# Patient Record
Sex: MALE | Race: WHITE | Employment: FULL TIME | URBAN - METROPOLITAN AREA
[De-identification: names, ages, dates, MRNs, and addresses within clinical notes are randomized per-mention and may not be internally consistent; named-entity substitution may affect disease eponyms.]

---

## 2020-09-30 ENCOUNTER — OFFICE VISIT (OUTPATIENT)
Dept: BEHAVIORAL HEALTH | Facility: CLINIC | Age: 30
End: 2020-09-30

## 2020-09-30 ENCOUNTER — OFFICE VISIT (OUTPATIENT)
Dept: FAMILY MEDICINE | Facility: CLINIC | Age: 30
End: 2020-09-30
Payer: COMMERCIAL

## 2020-09-30 VITALS
TEMPERATURE: 97.3 F | SYSTOLIC BLOOD PRESSURE: 120 MMHG | DIASTOLIC BLOOD PRESSURE: 76 MMHG | BODY MASS INDEX: 25.73 KG/M2 | WEIGHT: 179.75 LBS | RESPIRATION RATE: 15 BRPM | HEIGHT: 70 IN | HEART RATE: 53 BPM | OXYGEN SATURATION: 100 %

## 2020-09-30 DIAGNOSIS — Z23 NEEDS FLU SHOT: ICD-10-CM

## 2020-09-30 DIAGNOSIS — Z00.00 ENCOUNTER FOR PREVENTIVE CARE: Primary | ICD-10-CM

## 2020-09-30 DIAGNOSIS — Z13.220 SCREENING FOR LIPOID DISORDERS: ICD-10-CM

## 2020-09-30 DIAGNOSIS — F41.9 ANXIETY AND DEPRESSION: ICD-10-CM

## 2020-09-30 DIAGNOSIS — F32.A ANXIETY AND DEPRESSION: ICD-10-CM

## 2020-09-30 DIAGNOSIS — Z00.00 ROUTINE GENERAL MEDICAL EXAMINATION AT A HEALTH CARE FACILITY: ICD-10-CM

## 2020-09-30 DIAGNOSIS — F43.23 ADJUSTMENT DISORDER WITH MIXED ANXIETY AND DEPRESSED MOOD: Primary | ICD-10-CM

## 2020-09-30 DIAGNOSIS — Z12.5 SCREENING FOR PROSTATE CANCER: ICD-10-CM

## 2020-09-30 LAB
BUN SERPL-MCNC: 13 MG/DL (ref 5–24)
CALCIUM SERPL-MCNC: 9.9 MG/DL (ref 8.5–10.4)
CHLORIDE SERPLBLD-SCNC: 101 MMOL/L (ref 94–109)
CHOLEST SERPL-MCNC: 189 MG/DL (ref 0–200)
CHOLEST/HDLC SERPL: 3.2 {RATIO} (ref 0–5)
CO2 SERPL-SCNC: 30 MMOL/L (ref 20–32)
CREAT SERPL-MCNC: 0.8 MG/DL (ref 0.8–1.5)
EGFR CALCULATED (BLACK REFERENCE): 145
EGFR CALCULATED (NON BLACK REFERENCE): 119.8
FASTING SPECIMEN: NO
GLUCOSE SERPL-MCNC: 98 MG/DL (ref 60–99)
HDLC SERPL-MCNC: 59 MG/DL
LDLC SERPL CALC-MCNC: 107 MG/DL (ref 0–129)
POTASSIUM SERPL-SCNC: 4.9 MMOL/L (ref 3.4–5.3)
PSA SERPL-MCNC: 0.86 UG/L (ref 0–4)
SODIUM SERPL-SCNC: 142 MMOL/L (ref 137.3–146.3)
TRIGL SERPL-MCNC: 110 MG/DL (ref 0–150)
VLDL-CHOLESTEROL: 22 (ref 7–32)

## 2020-09-30 RX ORDER — CITALOPRAM HYDROBROMIDE 20 MG/1
20 TABLET ORAL DAILY
Qty: 30 TABLET | Refills: 0 | Status: SHIPPED | OUTPATIENT
Start: 2020-09-30 | End: 2020-10-22

## 2020-09-30 RX ORDER — LORATADINE 10 MG/1
10 TABLET ORAL DAILY PRN
COMMUNITY

## 2020-09-30 SDOH — HEALTH STABILITY: MENTAL HEALTH: HOW OFTEN DO YOU HAVE A DRINK CONTAINING ALCOHOL?: 2-4 TIMES A MONTH

## 2020-09-30 SDOH — HEALTH STABILITY: MENTAL HEALTH: HOW MANY DRINKS CONTAINING ALCOHOL DO YOU HAVE ON A TYPICAL DAY WHEN YOU ARE DRINKING?: 5 OR 6

## 2020-09-30 SDOH — HEALTH STABILITY: MENTAL HEALTH: HOW OFTEN DO YOU HAVE SIX OR MORE DRINKS ON ONE OCCASION?: WEEKLY

## 2020-09-30 ASSESSMENT — MIFFLIN-ST. JEOR: SCORE: 1776.59

## 2020-09-30 ASSESSMENT — PATIENT HEALTH QUESTIONNAIRE - PHQ9: SUM OF ALL RESPONSES TO PHQ QUESTIONS 1-9: 10

## 2020-09-30 NOTE — NURSING NOTE
"30 year old  Chief Complaint   Patient presents with     Physical     new     Anxiety     and depression recently       Blood pressure 120/76, pulse 53, temperature 97.3  F (36.3  C), temperature source Skin, resp. rate 15, height 1.778 m (5' 10\"), weight 81.5 kg (179 lb 12 oz), SpO2 100 %. Body mass index is 25.79 kg/m .  There is no problem list on file for this patient.      Wt Readings from Last 2 Encounters:   09/30/20 81.5 kg (179 lb 12 oz)     BP Readings from Last 3 Encounters:   09/30/20 120/76         Current Outpatient Medications   Medication     loratadine (CLARITIN) 10 MG tablet     No current facility-administered medications for this visit.        Social History     Tobacco Use     Smoking status: Light Tobacco Smoker     Smokeless tobacco: Never Used     Tobacco comment: occasionally   Substance Use Topics     Alcohol use: Yes     Frequency: 2-4 times a month     Drinks per session: 5 or 6     Binge frequency: Weekly     Drug use: Never       Health Maintenance Due   Topic Date Due     PREVENTIVE CARE VISIT  1989     HIV SCREENING  07/08/2004     PHQ-2  01/01/2020     INFLUENZA VACCINE (1) 09/01/2020       No results found for: PAP      September 30, 2020 10:52 AM    "

## 2020-09-30 NOTE — PROGRESS NOTES
3  SUBJECTIVE:   CC: Mohit Fitzpatrick is an 30 year old male who presents for preventive health visit.     Patient has been advised of split billing requirements and indicates understanding: Yes     Healthy Habits:    Do you get at least three servings of calcium containing foods daily (dairy, green leafy vegetables, etc.)? no, taking calcium and/or vitamin D supplement: no    Amount of exercise or daily activities, outside of work: 4 day(s) per week    Problems taking medications regularly not applicable    Medication side effects: No    Have you had an eye exam in the past two years? yes    Do you see a dentist twice per year? yes    Do you have sleep apnea, excessive snoring or daytime drowsiness?no      PROBLEMS TO ADD ON...  Anxiety and Depression  - seems like in the past year it has gotten worse  - feels like he has less patience, little things set him off  - doesn't really feel like he understands why things are different  - has never been to counseling or tried medication for this in the past    Today's PHQ-2 Score:   PHQ-2 ( 1999 [employer]) 9/30/2020   Q1: Little interest or pleasure in doing things 1   Q2: Feeling down, depressed or hopeless 2   PHQ-2 Score 3     PHQ 9/30/2020   PHQ-9 Total Score 10   Q9: Thoughts of better off dead/self-harm past 2 weeks Several days     Patient denies any active thoughts of self harm or concern for personal safety at this time    Abuse: Current or Past(Physical, Sexual or Emotional)- No  Do you feel safe in your environment? Yes    Social History     Tobacco Use     Smoking status: Not on file   Substance Use Topics     Alcohol use: Not on file     If you drink alcohol do you typically have >3 drinks per day or >7 drinks per week? No                      Last PSA: No results found for: PSA    Reviewed orders with patient. Reviewed health maintenance and updated orders accordingly - Yes    Reviewed and updated as needed this visit by clinical staff  Tobacco  Meds  Surg  "Hx  Fam Hx  Soc Hx        Reviewed and updated as needed this visit by Provider  Tobacco  Allergies  Meds  Problems  Med Hx  Surg Hx  Fam Hx  Soc Hx         Past Medical History:   Diagnosis Date     Anxiety and depression 9/30/2020      History reviewed. No pertinent surgical history.    ROS:  CONSTITUTIONAL: NEGATIVE for fever, chills, change in weight  INTEGUMENTARY/SKIN: NEGATIVE for worrisome rashes, moles or lesions  EYES: NEGATIVE for vision changes or irritation  ENT: NEGATIVE for ear, mouth and throat problems  RESP: NEGATIVE for significant cough or SOB  CV: NEGATIVE for chest pain, palpitations or peripheral edema  GI: NEGATIVE for nausea, abdominal pain, heartburn, or change in bowel habits   male: negative for dysuria, hematuria, decreased urinary stream, erectile dysfunction, urethral discharge  MUSCULOSKELETAL: NEGATIVE for significant arthralgias or myalgia  NEURO: NEGATIVE for weakness, dizziness or paresthesias  PSYCHIATRIC: Mood concerns as noted above.     OBJECTIVE:   /76 (BP Location: Left arm, Patient Position: Sitting, Cuff Size: Adult Regular)   Pulse 53   Temp 97.3  F (36.3  C) (Skin)   Resp 15   Ht 1.778 m (5' 10\")   Wt 81.5 kg (179 lb 12 oz)   SpO2 100%   BMI 25.79 kg/m    EXAM:  GENERAL: healthy, alert and no distress  EYES: Eyes grossly normal to inspection, PERRL and conjunctivae and sclerae normal  HENT: ear canals and TM's normal, nose and mouth without ulcers or lesions  NECK: no adenopathy, no asymmetry, masses, or scars and thyroid normal to palpation  RESP: lungs clear to auscultation - no rales, rhonchi or wheezes  CV: regular rate and rhythm, normal S1 S2, no S3 or S4, no murmur, click or rub, no peripheral edema and peripheral pulses strong  ABDOMEN: soft, nontender, no hepatosplenomegaly, no masses and bowel sounds normal  MS: no gross musculoskeletal defects noted, no edema  SKIN: no suspicious lesions or rashes  NEURO: Normal strength and tone, " "mentation intact and speech normal  PSYCH: mentation appears normal, affect normal/bright    Diagnostic Test Results:  Labs reviewed in Epic    ASSESSMENT/PLAN:   Mohit was seen today for physical and anxiety.    Diagnoses and all orders for this visit:    Encounter for preventive care    Needs flu shot  -     C RIV4 (FLUBLOK) VACCINE RECOMBINANT DNA PRSRV ANTIBIO FREE, IM  -     ADMIN INFLUENZA VIRUS VACCINE    Screening for lipoid disorders  -     Lipid Panel ([facility])  -     Basic Metabolic Panel ([facility])    Routine general medical examination at a health care facility    Anxiety and depression  -     citalopram (CELEXA) 20 MG tablet; Take 1 tablet (20 mg) by mouth daily    Screening for prostate cancer  -     PSA tumor marker    Discussed options for addressing mood issues including review of multiple medications and side effect profiles. Patient is agreeable starting citalopram at this time. Will monitor closely for the first month. Advised patient to notify me via [employer] if he has any questions or concerns for medication safety or efficacy. Plan to follow up in clinic in one month unless earlier.     Warm hand-off with [facility] today. Patient expresses interest in meeting with [facility] for help managing mood issues.     Patient has been advised of split billing requirements and indicates understanding: Yes  COUNSELING:  Reviewed preventive health counseling, as reflected in patient instructions    Estimated body mass index is 25.79 kg/m  as calculated from the following:    Height as of this encounter: 1.778 m (5' 10\").    Weight as of this encounter: 81.5 kg (179 lb 12 oz).        He reports that he has been smoking. He has never used smokeless tobacco.      Counseling Resources:  ATP IV Guidelines  Pooled Cohorts Equation Calculator  FRAX Risk Assessment  ICSI Preventive Guidelines  Dietary Guidelines for Americans, 2010  USDA's MyPlate  ASA Prophylaxis  Lung CA Screening    Niles Payne MD  [place] " [facility]

## 2020-09-30 NOTE — NURSING NOTE
"Injectable Influenza Immunization Documentation    1.  Has the patient received the information for the injectable influenza vaccine? YES     2. Is the patient 6 months of age or older? YES     3. Does the patient have any of the following contraindications?         Severe allergy to eggs? No     Severe allergic reaction to previous influenza vaccines? No   Severe allergy to latex? No       History of Guillain-Posey syndrome? No     Currently have a temperature greater than 100.4F? No        4.  Severely egg allergic patients should have flu vaccine eligibility assessed by an MD, RN, or pharmacist, and those who received flu vaccine should be observed for 15 min by an MD, RN, Pharmacist, Medical Technician, or member of clinic staff.\": YES    5. Latex-allergic patients should be given latex-free influenza vaccine Yes. Please reference the Vaccine latex table to determine if your clinic s product is latex-containing.       Vaccination given by Claudia Avila CMA          "

## 2020-10-01 ENCOUNTER — OFFICE VISIT (OUTPATIENT)
Dept: BEHAVIORAL HEALTH | Facility: CLINIC | Age: 30
End: 2020-10-01

## 2020-10-01 DIAGNOSIS — Z53.9 ERRONEOUS ENCOUNTER--DISREGARD: ICD-10-CM

## 2020-10-01 DIAGNOSIS — F43.23 ADJUSTMENT DISORDER WITH MIXED ANXIETY AND DEPRESSED MOOD: Primary | ICD-10-CM

## 2020-10-01 NOTE — PROGRESS NOTES
Patient had appointment with his  primary care physician. [facility] services were  offered. No immediate safety/risk issues were reported or identified.  Explained the role of the [facility] and provided informational handout and contact information for the [facility].  Regulo expressed interest and stated he would schedule appt in the future.        Shawn G. Ullrich, [facility], Behavioral Health Clinician

## 2020-10-01 NOTE — PROGRESS NOTES
Patient had appointment with his  primary care physician. [facility] services were  offered. No immediate safety/risk issues were reported or identified.  Explained the role of the [facility] and provided informational handout and contact information for the [facility].  Regulo expressed interest and stated he would schedule appt in the future.      Shawn G. Ullrich, [facility], Behavioral Health Clinician        This encounter was opened in error. Please disregard.

## 2020-10-14 ENCOUNTER — VIRTUAL VISIT (OUTPATIENT)
Dept: BEHAVIORAL HEALTH | Facility: CLINIC | Age: 30
End: 2020-10-14
Payer: COMMERCIAL

## 2020-10-14 DIAGNOSIS — F43.23 ADJUSTMENT DISORDER WITH MIXED ANXIETY AND DEPRESSED MOOD: Primary | ICD-10-CM

## 2020-10-14 ASSESSMENT — COLUMBIA-SUICIDE SEVERITY RATING SCALE - C-SSRS
ATTEMPT PAST THREE MONTHS: NO
2. HAVE YOU ACTUALLY HAD ANY THOUGHTS OF KILLING YOURSELF?: NO
2. HAVE YOU ACTUALLY HAD ANY THOUGHTS OF KILLING YOURSELF LIFETIME?: NO
TOTAL  NUMBER OF ABORTED OR SELF INTERRUPTED ATTEMPTS PAST LIFETIME: NO
TOTAL  NUMBER OF INTERRUPTED ATTEMPTS PAST 3 MONTHS: NO
TOTAL  NUMBER OF INTERRUPTED ATTEMPTS LIFETIME: NO
4. HAVE YOU HAD THESE THOUGHTS AND HAD SOME INTENTION OF ACTING ON THEM?: NO
6. HAVE YOU EVER DONE ANYTHING, STARTED TO DO ANYTHING, OR PREPARED TO DO ANYTHING TO END YOUR LIFE?: NO
5. HAVE YOU STARTED TO WORK OUT OR WORKED OUT THE DETAILS OF HOW TO KILL YOURSELF? DO YOU INTEND TO CARRY OUT THIS PLAN?: NO
1. IN THE PAST MONTH, HAVE YOU WISHED YOU WERE DEAD OR WISHED YOU COULD GO TO SLEEP AND NOT WAKE UP?: YES
1. IN THE PAST MONTH, HAVE YOU WISHED YOU WERE DEAD OR WISHED YOU COULD GO TO SLEEP AND NOT WAKE UP?: NO
ATTEMPT LIFETIME: NO
4. HAVE YOU HAD THESE THOUGHTS AND HAD SOME INTENTION OF ACTING ON THEM?: NO
5. HAVE YOU STARTED TO WORK OUT OR WORKED OUT THE DETAILS OF HOW TO KILL YOURSELF? DO YOU INTEND TO CARRY OUT THIS PLAN?: NO
6. HAVE YOU EVER DONE ANYTHING, STARTED TO DO ANYTHING, OR PREPARED TO DO ANYTHING TO END YOUR LIFE?: NO
3. HAVE YOU BEEN THINKING ABOUT HOW YOU MIGHT KILL YOURSELF?: NO
TOTAL  NUMBER OF ABORTED OR SELF INTERRUPTED ATTEMPTS PAST 3 MONTHS: NO

## 2020-10-14 ASSESSMENT — ANXIETY QUESTIONNAIRES
IF YOU CHECKED OFF ANY PROBLEMS ON THIS QUESTIONNAIRE, HOW DIFFICULT HAVE THESE PROBLEMS MADE IT FOR YOU TO DO YOUR WORK, TAKE CARE OF THINGS AT HOME, OR GET ALONG WITH OTHER PEOPLE: SOMEWHAT DIFFICULT
5. BEING SO RESTLESS THAT IT IS HARD TO SIT STILL: NOT AT ALL
2. NOT BEING ABLE TO STOP OR CONTROL WORRYING: NOT AT ALL
GAD7 TOTAL SCORE: 2
3. WORRYING TOO MUCH ABOUT DIFFERENT THINGS: SEVERAL DAYS
6. BECOMING EASILY ANNOYED OR IRRITABLE: NOT AT ALL
1. FEELING NERVOUS, ANXIOUS, OR ON EDGE: NOT AT ALL
7. FEELING AFRAID AS IF SOMETHING AWFUL MIGHT HAPPEN: NOT AT ALL

## 2020-10-14 ASSESSMENT — PATIENT HEALTH QUESTIONNAIRE - PHQ9
5. POOR APPETITE OR OVEREATING: SEVERAL DAYS
SUM OF ALL RESPONSES TO PHQ QUESTIONS 1-9: 3
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
## 2020-10-15 ASSESSMENT — ANXIETY QUESTIONNAIRES: GAD7 TOTAL SCORE: 2

## 2020-10-18 PROBLEM — F43.23 ADJUSTMENT DISORDER WITH MIXED ANXIETY AND DEPRESSED MOOD: Status: ACTIVE | Noted: 2020-10-18

## 2020-10-22 DIAGNOSIS — F32.A ANXIETY AND DEPRESSION: ICD-10-CM

## 2020-10-22 DIAGNOSIS — F41.9 ANXIETY AND DEPRESSION: ICD-10-CM

## 2020-10-22 RX ORDER — CITALOPRAM HYDROBROMIDE 10 MG/1
10 TABLET ORAL DAILY
Qty: 90 TABLET | Refills: 1 | Status: SHIPPED | OUTPATIENT
Start: 2020-10-22 | End: 2021-06-21

## 2020-10-22 NOTE — TELEPHONE ENCOUNTER
Refill order signed as previously noted.     Mohit was seen today for refill request.    Diagnoses and all orders for this visit:    Anxiety and depression  -     citalopram (CELEXA) 10 MG tablet; Take 1 tablet (10 mg) by mouth daily      Niles Payne MD  5:20 PM, October 22, 2020

## 2020-10-22 NOTE — TELEPHONE ENCOUNTER
Dr. Payne: Per last note, pt taking 10 mg instead of 20 mg. Teed up citalopram 10 mg #90 + 1 refill if that is OK with you.    Rhonda Lerner RN  10/22/20  4:12 PM

## 2020-11-25 ENCOUNTER — MYC REFILL (OUTPATIENT)
Dept: FAMILY MEDICINE | Facility: CLINIC | Age: 30
End: 2020-11-25

## 2020-11-25 DIAGNOSIS — F32.A ANXIETY AND DEPRESSION: ICD-10-CM

## 2020-11-25 DIAGNOSIS — F41.9 ANXIETY AND DEPRESSION: ICD-10-CM

## 2020-11-30 ENCOUNTER — MYC REFILL (OUTPATIENT)
Dept: FAMILY MEDICINE | Facility: CLINIC | Age: 30
End: 2020-11-30

## 2020-11-30 DIAGNOSIS — F32.A ANXIETY AND DEPRESSION: ICD-10-CM

## 2020-11-30 DIAGNOSIS — F41.9 ANXIETY AND DEPRESSION: ICD-10-CM

## 2020-11-30 RX ORDER — CITALOPRAM HYDROBROMIDE 10 MG/1
10 TABLET ORAL DAILY
Qty: 90 TABLET | Refills: 1 | OUTPATIENT
Start: 2020-11-30

## 2020-11-30 NOTE — TELEPHONE ENCOUNTER
Declined Rx refill at this time as patient should have refills available at pharmacy for another 3 months.    Niles Payne MD  2:16 PM, November 30, 2020

## 2021-06-21 ENCOUNTER — MYC MEDICAL ADVICE (OUTPATIENT)
Dept: FAMILY MEDICINE | Facility: CLINIC | Age: 31
End: 2021-06-21

## 2021-06-21 DIAGNOSIS — F32.A ANXIETY AND DEPRESSION: ICD-10-CM

## 2021-06-21 DIAGNOSIS — F41.9 ANXIETY AND DEPRESSION: ICD-10-CM

## 2021-06-21 RX ORDER — CITALOPRAM HYDROBROMIDE 10 MG/1
10 TABLET ORAL DAILY
Qty: 90 TABLET | Refills: 1 | Status: SHIPPED | OUTPATIENT
Start: 2021-06-21 | End: 2021-12-20

## 2021-06-21 NOTE — TELEPHONE ENCOUNTER
Agreed to refill for another 6 months. Plan on clinic visit at that time.     Diagnoses and all orders for this visit:    Anxiety and depression  -     citalopram (CELEXA) 10 MG tablet; Take 1 tablet (10 mg) by mouth daily      Niles Payne MD  1:00 PM, June 21, 2021

## 2021-10-11 ENCOUNTER — HEALTH MAINTENANCE LETTER (OUTPATIENT)
Age: 31
End: 2021-10-11

## 2021-12-05 ENCOUNTER — HEALTH MAINTENANCE LETTER (OUTPATIENT)
Age: 31
End: 2021-12-05

## 2021-12-20 DIAGNOSIS — F32.A ANXIETY AND DEPRESSION: ICD-10-CM

## 2021-12-20 DIAGNOSIS — F41.9 ANXIETY AND DEPRESSION: ICD-10-CM

## 2021-12-20 NOTE — TELEPHONE ENCOUNTER
Last time prescribed: 6/21/21 , 90 tabs/caps x 1 refills  Last office visit: 9/30/20  Next appointment: No Future Appointment Scheduled

## 2021-12-20 NOTE — TELEPHONE ENCOUNTER
Citalopram (Celexa) 10 mg    Last Office Visit: 9/30/20  [facility] Appointments: NONE  Medication last refilled: 6/21/21 #90 with 1 refill(s)    Last PHQ-9 9/30/2020 10/14/2020   PHQ-9 Total Score 10 3     PRESTON-7 SCORE 10/14/2020   Total Score 2     Routing refill request to provider for review/approval because:  Yaneli given x1 and patient did not follow up, please advise  Patient needs to be seen because it has been more than 1 year since last office visit.      Lisa Hilton, RN, BSN

## 2021-12-23 RX ORDER — CITALOPRAM HYDROBROMIDE 10 MG/1
10 TABLET ORAL DAILY
Qty: 15 TABLET | Refills: 0 | Status: SHIPPED | OUTPATIENT
Start: 2021-12-23 | End: 2022-01-21

## 2021-12-23 NOTE — TELEPHONE ENCOUNTER
Agreed to #15 tablet refill at this time. No further refills unless patient returns to clinic.     Mohit was seen today for refill request.    Diagnoses and all orders for this visit:    Anxiety and depression  -     citalopram (CELEXA) 10 MG tablet; Take 1 tablet (10 mg) by mouth daily      Niles Payne MD  1:39 PM, December 23, 2021

## 2021-12-27 ENCOUNTER — TELEPHONE (OUTPATIENT)
Dept: FAMILY MEDICINE | Facility: CLINIC | Age: 31
End: 2021-12-27
Payer: COMMERCIAL

## 2021-12-27 NOTE — TELEPHONE ENCOUNTER
----- Message from Lisa Hilton RN sent at 12/22/2021  9:38 AM CST -----  Regarding: Please call to schedule  Good Morning,  Please call Regulo to schedule him for a depression/anxiety follow-up with Dr. Payne.  He hasn't been seen in over a year and we're holding his medication refill until he either schedules or is seen.  I already sent a MyChart which he hasn't read.  Thanks!  Lisa

## 2022-01-21 ENCOUNTER — OFFICE VISIT (OUTPATIENT)
Dept: FAMILY MEDICINE | Facility: CLINIC | Age: 32
End: 2022-01-21
Payer: COMMERCIAL

## 2022-01-21 VITALS
HEART RATE: 59 BPM | BODY MASS INDEX: 26.49 KG/M2 | TEMPERATURE: 97.8 F | SYSTOLIC BLOOD PRESSURE: 122 MMHG | WEIGHT: 185.04 LBS | DIASTOLIC BLOOD PRESSURE: 74 MMHG | OXYGEN SATURATION: 94 % | HEIGHT: 70 IN

## 2022-01-21 DIAGNOSIS — F41.9 ANXIETY AND DEPRESSION: ICD-10-CM

## 2022-01-21 DIAGNOSIS — F32.A ANXIETY AND DEPRESSION: ICD-10-CM

## 2022-01-21 RX ORDER — CITALOPRAM HYDROBROMIDE 10 MG/1
10 TABLET ORAL DAILY
Qty: 90 TABLET | Refills: 3 | Status: SHIPPED | OUTPATIENT
Start: 2022-01-21 | End: 2022-09-16

## 2022-01-21 ASSESSMENT — ANXIETY QUESTIONNAIRES
5. BEING SO RESTLESS THAT IT IS HARD TO SIT STILL: NOT AT ALL
6. BECOMING EASILY ANNOYED OR IRRITABLE: NOT AT ALL
7. FEELING AFRAID AS IF SOMETHING AWFUL MIGHT HAPPEN: NOT AT ALL
GAD7 TOTAL SCORE: 2
2. NOT BEING ABLE TO STOP OR CONTROL WORRYING: NOT AT ALL
1. FEELING NERVOUS, ANXIOUS, OR ON EDGE: NOT AT ALL
IF YOU CHECKED OFF ANY PROBLEMS ON THIS QUESTIONNAIRE, HOW DIFFICULT HAVE THESE PROBLEMS MADE IT FOR YOU TO DO YOUR WORK, TAKE CARE OF THINGS AT HOME, OR GET ALONG WITH OTHER PEOPLE: NOT DIFFICULT AT ALL
3. WORRYING TOO MUCH ABOUT DIFFERENT THINGS: SEVERAL DAYS

## 2022-01-21 ASSESSMENT — PATIENT HEALTH QUESTIONNAIRE - PHQ9
5. POOR APPETITE OR OVEREATING: SEVERAL DAYS
SUM OF ALL RESPONSES TO PHQ QUESTIONS 1-9: 3

## 2022-01-21 ASSESSMENT — MIFFLIN-ST. JEOR: SCORE: 1795.59

## 2022-01-21 NOTE — PROGRESS NOTES
SUBJECTIVE:   Mohit Fitzpatrick is a 31 year old male who presents to clinic today to discuss the following problem(s).    Anxiety and depression  - last seen for this in September of 2020  - initially started on 20mg daily of citalopram, later decreased dose to 10mg daily  - patient reports no concern for side effects   - patient reports notable improvement in mood on medication  - PHQ and PRESTON scores as noted below    ROS:   CONSTITUTIONAL: NEGATIVE for chills, fatigue, fever,sweats and weight loss  EYES: NEGATIVE for diplopia, eye pain and photophobia  ENT/MOUTH: NEGATIVE for nasal congestion, postnasal drainage and rhinorrhea  RESP: NEGATIVE for cough, hemoptysis, SOB/dyspnea and wheezing  CV: NEGATIVE for chest pain/chest pressure, dyspnea on exertion  GI: NEGATIVE for abdominal pain, diarrhea, dysphagia and nausea  : NEGATIVE for dysuria, frequency and hematuria  MUSCULOSKELETAL: NEGATIVE for arthralgias, cyanosis, or edema  INTEGUMENTARY/SKIN: NEGATIVE for new lesions and pruritis  NEURO: NEGATIVE for dizziness/lightheadedness, gait disturbance, memory problems and paresthesias   ENDOCRINE: NEGATIVE for cold intolerance and heat intolerance  HEME/ALLERGY/IMMUNE: NEGATIVE for bleeding disorder and swollen nodes  PSYCHIATRIC: NEGATIVE    PHQ 9/30/2020 10/14/2020 1/21/2022   PHQ-9 Total Score 10 3 3   Q9: Thoughts of better off dead/self-harm past 2 weeks Several days Not at all Not at all     PRESTON-7 SCORE 10/14/2020 1/21/2022   Total Score 2 2       Past Medical History:   Diagnosis Date     Anxiety and depression 9/30/2020     History reviewed. No pertinent surgical history.  Family History   Problem Relation Age of Onset     Bipolar Disorder Father      Social History     Tobacco Use     Smoking status: Light Tobacco Smoker     Smokeless tobacco: Never Used     Tobacco comment: occasionally   Substance Use Topics     Alcohol use: Yes     Drug use: Never     Social History     Social History Narrative     "Originally form [place], but has lived in [place] for 13 years. Recently moved downtown to an apartment with wife. No kids, maybe down the road. No pets. Works for an [employer].        Current Outpatient Medications   Medication     citalopram (CELEXA) 10 MG tablet     loratadine (CLARITIN) 10 MG tablet     No current facility-administered medications for this visit.     I have reviewed the patient's past medical, surgical, family, and social history.     OBJECTIVE:   /74   Pulse 59   Temp 97.8  F (36.6  C)   Ht 1.778 m (5' 10\")   Wt 83.9 kg (185 lb 0.6 oz)   SpO2 94%   BMI 26.55 kg/m      Constitutional: well-appearing, appears stated age  Eyes: conjunctivae without erythema, sclera anicteric.   Cardiac: regular rate and rhythm, normal S1/S2, no murmur/rubs/gallops  Respiratory: lungs clear to auscultation bilaterally, normal work of breathing, no wheezes/crackles  Skin: no rashes, lesions, or wounds  Psych: affect is full and appropriate, speech is fluent and non-pressured    ASSESSMENT AND PLAN:     Mohit was seen today for anxiety.    Diagnoses and all orders for this visit:    Anxiety and depression  -     citalopram (CELEXA) 10 MG tablet; Take 1 tablet (10 mg) by mouth daily    Med refilled as noted above. Encouraged Regulo to contact clinic with any future questions or concerns as well as to schedule an annual wellness exam.       Niles Payne MD  [facility]  01/20/2022, 6:43 PM    "

## 2022-01-22 ASSESSMENT — ANXIETY QUESTIONNAIRES: GAD7 TOTAL SCORE: 2

## 2022-03-25 ENCOUNTER — OFFICE VISIT (OUTPATIENT)
Dept: FAMILY MEDICINE | Facility: CLINIC | Age: 32
End: 2022-03-25
Payer: COMMERCIAL

## 2022-03-25 VITALS
BODY MASS INDEX: 26.77 KG/M2 | OXYGEN SATURATION: 97 % | SYSTOLIC BLOOD PRESSURE: 132 MMHG | DIASTOLIC BLOOD PRESSURE: 70 MMHG | HEIGHT: 70 IN | WEIGHT: 187 LBS | TEMPERATURE: 96.9 F | HEART RATE: 57 BPM

## 2022-03-25 DIAGNOSIS — H01.009 BLEPHARITIS, UNSPECIFIED LATERALITY, UNSPECIFIED TYPE: Primary | ICD-10-CM

## 2022-03-25 RX ORDER — ERYTHROMYCIN 5 MG/G
OINTMENT OPHTHALMIC
Qty: 3.5 G | Refills: 0 | Status: SHIPPED | OUTPATIENT
Start: 2022-03-25 | End: 2023-04-17

## 2022-03-25 NOTE — NURSING NOTE
"31 year old  Chief Complaint   Patient presents with     Eye Problem     both eyelid are itchy and irritatable  x 2 wks       hx of seasonal allergy        Blood pressure 132/70, pulse 57, temperature 96.9  F (36.1  C), temperature source Temporal, height 1.765 m (5' 9.5\"), weight 84.8 kg (187 lb), SpO2 97 %. Body mass index is 27.22 kg/m .  Patient Active Problem List   Diagnosis     Anxiety and depression     Adjustment disorder with mixed anxiety and depressed mood       Wt Readings from Last 2 Encounters:   03/25/22 84.8 kg (187 lb)   01/21/22 83.9 kg (185 lb 0.6 oz)     BP Readings from Last 3 Encounters:   03/25/22 132/70   01/21/22 122/74   09/30/20 120/76         Current Outpatient Medications   Medication     citalopram (CELEXA) 10 MG tablet     loratadine (CLARITIN) 10 MG tablet     No current facility-administered medications for this visit.       Social History     Tobacco Use     Smoking status: Former Smoker     Smokeless tobacco: Never Used     Tobacco comment: occasionally   Substance Use Topics     Alcohol use: Yes     Drug use: Never       Health Maintenance Due   Topic Date Due     ADVANCE CARE PLANNING  Never done     HIV SCREENING  Never done     HEPATITIS C SCREENING  Never done     PREVENTIVE CARE VISIT  09/30/2021       No results found for: PAP      March 25, 2022 2:36 PM  "

## 2022-03-25 NOTE — PROGRESS NOTES
"OVERVIEW: Mohit Fitzpatrick is a 31 year old male who presents to [facility] today for Eye Problem (both eyelid are itchy and irritatable  x 2 wks       hx of seasonal allergy )        ASSESSMENT/PLAN:    1. Blepharitis   - Wash lashes and lids before bed and in morning daily.   - Apply Ilotycin (Erythromycin) antibiotic ointment 3-4 times/day  - Minimize use of other creams  - Keep hands away from eyelids if possible.   - If no improvement, may try low potency cortisone cream or call us and we can prescribe Desonide ointment.     --Mina Marie MD      SUBJECTIVE:   Eyelids have been slightly red and irritated and itchy for about the past 3 weeks. No change in vision.   Has not been seen yet.   No contact lenses. No glasses.     Uses [employer] moisturizing    Review Of Systems:    Has otherwise been in usual state of health, e.g.   Cardiovascular: negative  Respiratory: No shortness of breath, dyspnea on exertion, cough, or hemoptysis  Gastrointestinal: negative  Genitourinary: negative    Problem list per EMR:  Patient Active Problem List   Diagnosis     Anxiety and depression     Adjustment disorder with mixed anxiety and depressed mood       Current Outpatient Medications   Medication Sig Dispense Refill     citalopram (CELEXA) 10 MG tablet Take 1 tablet (10 mg) by mouth daily 90 tablet 3     loratadine (CLARITIN) 10 MG tablet Take 10 mg by mouth daily as needed for allergies         Allergies   Allergen Reactions     Seasonal Allergies         Social:   He and his wife recently had a baby that was born a bit premature but seems to be doing well.      OBJECTIVE    Vitals: /70 (BP Location: Right arm, Patient Position: Sitting, Cuff Size: Adult Large)   Pulse 57   Temp 96.9  F (36.1  C) (Temporal)   Ht 1.765 m (5' 9.5\")   Wt 84.8 kg (187 lb)   SpO2 97%   BMI 27.22 kg/m    BMI= Body mass index is 27.22 kg/m .  Appears in no distress.  There is faint scaly redness on the eyelids and also the " skin just beneath the eyes.  No extending warmth.  Sclera appear clear.  Eyes move in all directions without difficulty.  No swelling of the conjunctiva.  Neck was supple without lymphadenopathy.  Face also had no lymphadenopathy.  No asymmetries.    SEE TOP OF NOTE FOR ASSESSMENT AND PLAN    --Mina Marie MD  [facility], Department of Family Medicine and Community Health

## 2022-09-16 ENCOUNTER — OFFICE VISIT (OUTPATIENT)
Dept: FAMILY MEDICINE | Facility: CLINIC | Age: 32
End: 2022-09-16
Payer: COMMERCIAL

## 2022-09-16 VITALS
SYSTOLIC BLOOD PRESSURE: 118 MMHG | HEART RATE: 58 BPM | TEMPERATURE: 98.1 F | BODY MASS INDEX: 26.93 KG/M2 | WEIGHT: 185.04 LBS | DIASTOLIC BLOOD PRESSURE: 75 MMHG | OXYGEN SATURATION: 96 % | RESPIRATION RATE: 16 BRPM

## 2022-09-16 DIAGNOSIS — R21 RASH: ICD-10-CM

## 2022-09-16 DIAGNOSIS — F41.9 ANXIETY AND DEPRESSION: ICD-10-CM

## 2022-09-16 DIAGNOSIS — Z23 NEED FOR PROPHYLACTIC VACCINATION AND INOCULATION AGAINST INFLUENZA: Primary | ICD-10-CM

## 2022-09-16 DIAGNOSIS — F32.A ANXIETY AND DEPRESSION: ICD-10-CM

## 2022-09-16 RX ORDER — CITALOPRAM HYDROBROMIDE 10 MG/1
10 TABLET ORAL DAILY
Qty: 90 TABLET | Refills: 3 | Status: SHIPPED | OUTPATIENT
Start: 2022-09-16 | End: 2023-12-11

## 2022-09-16 RX ORDER — TRIAMCINOLONE ACETONIDE 0.25 MG/G
CREAM TOPICAL 2 TIMES DAILY
Qty: 15 G | Refills: 3 | Status: SHIPPED | OUTPATIENT
Start: 2022-09-16 | End: 2023-04-17

## 2022-09-16 ASSESSMENT — PAIN SCALES - GENERAL: PAINLEVEL: NO PAIN (0)

## 2022-09-16 NOTE — NURSING NOTE
32 year old  Chief Complaint   Patient presents with     Derm Problem     Patient was seen about the redness and mild burning around his left eye x4 months. He did tried using the ointment but it didn't seem to help.      Flu Shot       Blood pressure 118/75, pulse 58, temperature 98.1  F (36.7  C), resp. rate 16, weight 83.9 kg (185 lb 0.6 oz), SpO2 96 %. Body mass index is 26.93 kg/m .  Patient Active Problem List   Diagnosis     Anxiety and depression     Adjustment disorder with mixed anxiety and depressed mood       Wt Readings from Last 2 Encounters:   09/16/22 83.9 kg (185 lb 0.6 oz)   03/25/22 84.8 kg (187 lb)     BP Readings from Last 3 Encounters:   09/16/22 118/75   03/25/22 132/70   01/21/22 122/74         Current Outpatient Medications   Medication     citalopram (CELEXA) 10 MG tablet     erythromycin (ROMYCIN) 5 MG/GM ophthalmic ointment     loratadine (CLARITIN) 10 MG tablet     No current facility-administered medications for this visit.       Social History     Tobacco Use     Smoking status: Former Smoker     Smokeless tobacco: Never Used     Tobacco comment: occasionally   Substance Use Topics     Alcohol use: Yes     Drug use: Never       Health Maintenance Due   Topic Date Due     ADVANCE CARE PLANNING  Never done     HIV SCREENING  Never done     HEPATITIS C SCREENING  Never done     PREVENTIVE CARE VISIT  09/30/2021     INFLUENZA VACCINE (1) 09/01/2022       No results found for: ARIEL Otero, [place], [facility]  September 16, 2022 12:52 PM      Injectable Influenza Immunization Documentation    1.  Has the patient received the information for the injectable influenza vaccine? YES     2. Is the patient 6 months of age or older? YES     3. Does the patient have any of the following contraindications?         Severe allergy to eggs? No     Severe allergic reaction to previous influenza vaccines? No   Severe allergy to latex? No       History of Guillain-Mcclusky syndrome? No     Currently have a  "temperature greater than 100.4F? No        4.  Severely egg allergic patients should have flu vaccine eligibility assessed by an MD, RN, or pharmacist, and those who received flu vaccine should be observed for 15 min by an MD, RN, Pharmacist, Medical Technician, or member of clinic staff.\": YES    5. Latex-allergic patients should be given latex-free influenza vaccine Yes. Please reference the Vaccine latex table to determine if your clinic s product is latex-containing.       Vaccination given by Cyndi Otero CMA,[facility]  September 16, 2022 12:58 PM                "

## 2023-01-29 ENCOUNTER — HEALTH MAINTENANCE LETTER (OUTPATIENT)
Age: 33
End: 2023-01-29

## 2023-02-20 ENCOUNTER — TELEPHONE (OUTPATIENT)
Dept: FAMILY MEDICINE | Facility: CLINIC | Age: 33
End: 2023-02-20

## 2023-02-20 ENCOUNTER — OFFICE VISIT (OUTPATIENT)
Dept: FAMILY MEDICINE | Facility: CLINIC | Age: 33
End: 2023-02-20
Payer: COMMERCIAL

## 2023-02-20 VITALS
HEART RATE: 55 BPM | HEIGHT: 70 IN | DIASTOLIC BLOOD PRESSURE: 82 MMHG | BODY MASS INDEX: 26.48 KG/M2 | RESPIRATION RATE: 15 BRPM | WEIGHT: 185 LBS | TEMPERATURE: 98 F | SYSTOLIC BLOOD PRESSURE: 127 MMHG | OXYGEN SATURATION: 96 %

## 2023-02-20 DIAGNOSIS — J02.9 SORE THROAT: Primary | ICD-10-CM

## 2023-02-20 DIAGNOSIS — J02.0 STREPTOCOCCAL PHARYNGITIS: Primary | ICD-10-CM

## 2023-02-20 LAB
DEPRECATED S PYO AG THROAT QL EIA: NEGATIVE
GROUP A STREP BY PCR: DETECTED

## 2023-02-20 PROCEDURE — 87651 STREP A DNA AMP PROBE: CPT | Performed by: FAMILY MEDICINE

## 2023-02-20 RX ORDER — PENICILLIN V POTASSIUM 500 MG/1
500 TABLET, FILM COATED ORAL 2 TIMES DAILY
Qty: 20 TABLET | Refills: 0 | Status: SHIPPED | OUTPATIENT
Start: 2023-02-20 | End: 2023-04-19

## 2023-02-20 NOTE — PROGRESS NOTES
"ASSESSMENT AND PLAN:     (J02.9) Sore throat  (primary encounter diagnosis)  Comment: Acute, self-limited  Likely viral syndrome.  Rapid strep in office negative, sending for confirmatory PCR.  Reviewed home supportive care measures.   Plan: Streptococcus A Rapid Screen w/Reflex to PCR -         Clinic Collect, Group A Streptococcus PCR         Throat Swab          Roberto Cline MD   [facility]  02/20/2023, 11:21 AM      SUBJECTIVE:   Regulo is a 32 year old male who presents to clinic today for a return visit.    # Sore Throat  - woke up 2 days ago, not feeling well, hot and cold  - had a mild sore throat, worsened yesterday, feels the same today    - infrequent dry cough  - no nasal congestion or rhinorrhea  - ear pressure, feels like they need to pop  - had a temperature of 100.2 yesterday  - drinking a lot of fluids  - painful with swallowing but able to eat solid foods    - took a home COVID test yesterday and this morning, both negative    - has 11 mo old in , doing welll  - no recent antibiotic exposure    Patient Active Problem List   Diagnosis     Anxiety and depression     Adjustment disorder with mixed anxiety and depressed mood     Current Outpatient Medications   Medication     citalopram (CELEXA) 10 MG tablet     loratadine (CLARITIN) 10 MG tablet     erythromycin (ROMYCIN) 5 MG/GM ophthalmic ointment     triamcinolone (KENALOG) 0.025 % cream     No current facility-administered medications for this visit.       I have reviewed the patient's relevant past medical history.     OBJECTIVE:   /82 (BP Location: Right arm, Patient Position: Sitting, Cuff Size: Adult Regular)   Pulse 55   Temp 98  F (36.7  C) (Skin)   Resp 15   Ht 1.778 m (5' 10\")   Wt 83.9 kg (185 lb)   SpO2 96%   BMI 26.54 kg/m      Constitutional: well-appearing, appears stated age  Eyes: conjunctivae without erythema, sclera anicteric.   ENT: posterior oropharynx erythematous without " cobblestoning. Tonsils not enlarged but do have adherent white exudate bilaterally.  Neck: tender to palpation left neck at anterior superior border of SCM but no palpable lymphadenopathy.   Skin: no rashes, lesions, or wounds  Psych: affect is full and appropriate, speech is fluent and non-pressured

## 2023-02-20 NOTE — TELEPHONE ENCOUNTER
Seen today for sore throat with tonsillar exudate  Rapid strep was negative  Strep PCR positive  Will treat for strep pharyngitis with 10 days Penicillin V 500mg twice a day    Attempted to call Regulo twice.  No answer.  Left voicemail on his personal voicemail about the results and prescription.    Roberto Cline MD on 2/20/2023 at 4:45 PM

## 2023-04-18 ENCOUNTER — MYC MEDICAL ADVICE (OUTPATIENT)
Dept: FAMILY MEDICINE | Facility: CLINIC | Age: 33
End: 2023-04-18

## 2023-04-18 DIAGNOSIS — J02.9 SORE THROAT: Primary | ICD-10-CM

## 2023-04-19 ENCOUNTER — LAB (OUTPATIENT)
Dept: LAB | Facility: CLINIC | Age: 33
End: 2023-04-19
Payer: COMMERCIAL

## 2023-04-19 DIAGNOSIS — J02.0 STREPTOCOCCAL PHARYNGITIS: ICD-10-CM

## 2023-04-19 LAB — DEPRECATED S PYO AG THROAT QL EIA: POSITIVE

## 2023-04-19 RX ORDER — PENICILLIN V POTASSIUM 500 MG/1
500 TABLET, FILM COATED ORAL 2 TIMES DAILY
Qty: 20 TABLET | Refills: 0 | Status: SHIPPED | OUTPATIENT
Start: 2023-04-19 | End: 2024-01-31

## 2023-04-19 NOTE — PROGRESS NOTES
Collected a throat specimen for Rapid Strep. Test.    Cyndi Otero, [place],CMA  April 19, 2023 1:50 PM

## 2023-04-19 NOTE — TELEPHONE ENCOUNTER
Order placed for rapid strep test. Pt advised to schedule lab-only appointment for collection.    Amol JAMISON, RN  04/19/23 8:13 AM

## 2023-04-19 NOTE — PROGRESS NOTES
Pt c/o of sore throat positive with rapid strep A test collected in lab appointment today. Plan to treat again with Pencillin V K for 10 day course, like previous infection. This is pt's second streptococcal pharyngitis in the last two months. Pt notified of results and plan.    Amol JAMISON, RN  04/19/23 2:17 PM

## 2023-07-20 ENCOUNTER — OFFICE VISIT (OUTPATIENT)
Dept: FAMILY MEDICINE | Facility: CLINIC | Age: 33
End: 2023-07-20
Payer: COMMERCIAL

## 2023-07-20 VITALS
SYSTOLIC BLOOD PRESSURE: 123 MMHG | DIASTOLIC BLOOD PRESSURE: 79 MMHG | HEIGHT: 70 IN | HEART RATE: 50 BPM | TEMPERATURE: 97 F | OXYGEN SATURATION: 96 % | WEIGHT: 189.04 LBS | BODY MASS INDEX: 27.06 KG/M2

## 2023-07-20 DIAGNOSIS — M54.2 NECK PAIN ON RIGHT SIDE: Primary | ICD-10-CM

## 2023-07-20 PROBLEM — F43.23 ADJUSTMENT DISORDER WITH MIXED ANXIETY AND DEPRESSED MOOD: Status: RESOLVED | Noted: 2020-10-18 | Resolved: 2023-07-20

## 2023-07-20 NOTE — PROGRESS NOTES
"CC: Throat Problem (Right side of throat has a lingering constant feeling of a burn, painful. Started around July 8th.)      SUBJECTIVE: Regulo is a 33 year old male with recent strep throat in February and April who comes in with the following concerns:    July 7 - Throat felt burning and sore. Has had Strep throat twice this year, so he did a [facility] care appt on July 8 and was prescribed Penicillin. That reduced the pain from 8/10 to 5/10. But throat pain never fully went away. But last few times he had Strep throat, within a day his symptoms would improve. Since then, he has had lingering feeling of pain. Only on the right side. Can feel a spot of discomfort -- \"not super painful\" - describes it as a dull light burn. No difficulty swallowing. No change in voice. No fevers or chills. Maybe slight R ear pain.       OBJECTIVE:   /79   Pulse 50   Temp 97  F (36.1  C)   Ht 1.778 m (5' 10\")   Wt 85.7 kg (189 lb 0.6 oz)   SpO2 96%   BMI 27.12 kg/m    General: Alert and oriented in no acute distress.  Skin: Clear without lesions or rashes.   Lymph: No anterior cervical lymphadenopathy.   Eyes: PERRL. EOMI. Right conjunctiva slightly erythematous.   ENT: TMs intact and pearly gray. Oropharynx moist without lesions or exudate. Supple neck.      ASSESSMENT/PLAN:   Mohit was seen today for throat problem.    Diagnoses and all orders for this visit:    Neck pain on right side  -     US Head Neck Soft Tissue; Future    Unclear etiology of pain -- OP appears normal. Pt points more to the external neck for the area of pain. No lymphadenopathy noted. Consider US head/neck of the area to evaluate. If symptoms persist and US normal, consider ENT consult. Worrisome signs and symptoms were discussed with Regulo and he was instructed to return to the clinic for concerning symptoms or to call with questions.  Return if symptoms worsen or fail to improve.      Hayley Jarrell MD  Family Medicine    "

## 2023-07-22 ENCOUNTER — ANCILLARY PROCEDURE (OUTPATIENT)
Dept: ULTRASOUND IMAGING | Facility: CLINIC | Age: 33
End: 2023-07-22
Attending: FAMILY MEDICINE
Payer: COMMERCIAL

## 2023-07-22 DIAGNOSIS — M54.2 NECK PAIN ON RIGHT SIDE: ICD-10-CM

## 2023-07-22 PROCEDURE — 76536 US EXAM OF HEAD AND NECK: CPT | Performed by: RADIOLOGY

## 2023-12-11 DIAGNOSIS — F41.9 ANXIETY AND DEPRESSION: ICD-10-CM

## 2023-12-11 DIAGNOSIS — F32.A ANXIETY AND DEPRESSION: ICD-10-CM

## 2023-12-11 RX ORDER — CITALOPRAM HYDROBROMIDE 10 MG/1
10 TABLET ORAL DAILY
Qty: 30 TABLET | Refills: 0 | Status: SHIPPED | OUTPATIENT
Start: 2023-12-11 | End: 2024-01-31

## 2023-12-11 NOTE — TELEPHONE ENCOUNTER
Medication requested: citalopram (CELEXA) 10 MG tablet   Last office visit: 7/20/23  [facility] appointments: none  Medication last refilled: 9/16/22; 90 + 3 refills  Last qualifying labs:    1/21/2022  9:48 AM   PHQ-9 / PRESTON-7 Scores 8/2015 to present    PRESTON-7 Score DocFlow 2    PHQ-9 Score DocFlow 3      Medication is being filled for 1 time refill only due to:  Patient needs to be seen because due for mental health follow-up appt.    Message sent to pt to schedule mental health follow-up appt with Dr. Payne.    Amol JAMISON, RN  12/11/23 3:33 PM

## 2024-01-26 ENCOUNTER — HOSPITAL ENCOUNTER (EMERGENCY)
Facility: CLINIC | Age: 34
Discharge: HOME | End: 2024-01-26
Attending: EMERGENCY MEDICINE | Admitting: EMERGENCY MEDICINE
Payer: COMMERCIAL

## 2024-01-26 VITALS
TEMPERATURE: 98 F | SYSTOLIC BLOOD PRESSURE: 118 MMHG | HEART RATE: 69 BPM | OXYGEN SATURATION: 97 % | WEIGHT: 189 LBS | DIASTOLIC BLOOD PRESSURE: 74 MMHG | BODY MASS INDEX: 27.12 KG/M2 | RESPIRATION RATE: 16 BRPM

## 2024-01-26 DIAGNOSIS — L50.9 URTICARIA: ICD-10-CM

## 2024-01-26 PROCEDURE — 99284 EMERGENCY DEPT VISIT MOD MDM: CPT | Performed by: EMERGENCY MEDICINE

## 2024-01-26 PROCEDURE — 250N000013 HC RX MED GY IP 250 OP 250 PS 637: Performed by: EMERGENCY MEDICINE

## 2024-01-26 PROCEDURE — 96360 HYDRATION IV INFUSION INIT: CPT | Performed by: EMERGENCY MEDICINE

## 2024-01-26 PROCEDURE — 250N000012 HC RX MED GY IP 250 OP 636 PS 637: Performed by: EMERGENCY MEDICINE

## 2024-01-26 PROCEDURE — 258N000003 HC RX IP 258 OP 636: Performed by: EMERGENCY MEDICINE

## 2024-01-26 RX ORDER — PREDNISONE 20 MG/1
60 TABLET ORAL DAILY
Status: DISCONTINUED | OUTPATIENT
Start: 2024-01-26 | End: 2024-01-26 | Stop reason: HOSPADM

## 2024-01-26 RX ORDER — PREDNISONE 50 MG/1
50 TABLET ORAL DAILY
Qty: 4 TABLET | Refills: 0 | Status: SHIPPED | OUTPATIENT
Start: 2024-01-27 | End: 2024-01-31

## 2024-01-26 RX ORDER — FAMOTIDINE 20 MG/1
40 TABLET, FILM COATED ORAL ONCE
Status: COMPLETED | OUTPATIENT
Start: 2024-01-26 | End: 2024-01-26

## 2024-01-26 RX ORDER — DIPHENHYDRAMINE HCL 50 MG
50 CAPSULE ORAL ONCE
Status: COMPLETED | OUTPATIENT
Start: 2024-01-26 | End: 2024-01-26

## 2024-01-26 RX ORDER — EPINEPHRINE 0.3 MG/.3ML
0.3 INJECTION SUBCUTANEOUS PRN
Qty: 2 EACH | Refills: 0 | Status: SHIPPED | OUTPATIENT
Start: 2024-01-26

## 2024-01-26 RX ADMIN — FAMOTIDINE 40 MG: 20 TABLET ORAL at 08:59

## 2024-01-26 RX ADMIN — SODIUM CHLORIDE 1000 ML: 9 INJECTION, SOLUTION INTRAVENOUS at 09:10

## 2024-01-26 RX ADMIN — PREDNISONE 60 MG: 20 TABLET ORAL at 08:59

## 2024-01-26 RX ADMIN — DIPHENHYDRAMINE HYDROCHLORIDE 50 MG: 50 CAPSULE ORAL at 08:58

## 2024-01-26 ASSESSMENT — ACTIVITIES OF DAILY LIVING (ADL)
ADLS_ACUITY_SCORE: 35
ADLS_ACUITY_SCORE: 33

## 2024-01-26 NOTE — DISCHARGE INSTRUCTIONS
Please take Benadryl 50 mg every 8 hours until the hives go away.  You can also take Pepcid (famotidine) 40 mg twice a day until the hives go away as well.  Additionally, I will prescribe a course of prednisone, a steroid, that you can resume taking tomorrow since you already got today's dose.  Lastly, I will prescribe an EpiPen since your risk for anaphylaxis is higher than other people who have not had your type of rash going forward.

## 2024-01-26 NOTE — ED PROVIDER NOTES
ED Provider Note  [facility]      History     Chief Complaint   Patient presents with    Hives    Diarrhea     HPI  Mohit Fitzpatrick is a 33 year old male who presents emergency department with onset since last night of diffuse urticarial pruritic rash with concern for difficulty breathing as well.  Patient reports that prior to this, approximately 4 days ago he began feeling tired, rundown.  Found to have a diarrheal illness with frequent diarrhea yesterday.  Yesterday, he took a dose of Imodium around noon, another dose with 2 tablets around 5 PM.  Approximately an hour and a half later he noticed hives developing.  He has no prior history of any hives.  States that since onset of the hives, he has taken a dose of Claritin as well as an adult dose of children's Benadryl.  Slightly nauseated currently.    Past Medical History  Past Medical History:   Diagnosis Date    Anxiety and depression 9/30/2020     History reviewed. No pertinent surgical history.  EPINEPHrine (ANY BX GENERIC EQUIV) 0.3 MG/0.3ML injection 2-pack  Loperamide HCl (IMODIUM OR)  [START ON 1/27/2024] predniSONE (DELTASONE) 50 MG tablet  citalopram (CELEXA) 10 MG tablet  loratadine (CLARITIN) 10 MG tablet  penicillin V (VEETID) 500 MG tablet      Allergies   Allergen Reactions    Seasonal Allergies     Loperamide Rash     Urticaria     Family History  Family History   Problem Relation Age of Onset    Bipolar Disorder Father      Social History   Social History     Tobacco Use    Smoking status: Former    Smokeless tobacco: Never    Tobacco comments:     occasionally   Vaping Use    Vaping Use: Never used   Substance Use Topics    Alcohol use: Yes    Drug use: Never         A medically appropriate review of systems was performed with pertinent positives and negatives noted in the HPI, and all other systems negative.    Physical Exam   BP: 122/88  Pulse: 86  Temp: 98  F (36.7  C)  Resp: 18  Weight: 85.7 kg (189 lb)  SpO2:  96 %  Physical Exam  Constitutional:       General: He is awake. He is not in acute distress.     Appearance: Normal appearance. He is well-developed. He is not ill-appearing or toxic-appearing.   HENT:      Head: Atraumatic.   Eyes:      General: No scleral icterus.     Pupils: Pupils are equal, round, and reactive to light.   Cardiovascular:      Rate and Rhythm: Normal rate and regular rhythm.      Heart sounds: Normal heart sounds, S1 normal and S2 normal.   Pulmonary:      Effort: No tachypnea or respiratory distress.      Breath sounds: Normal breath sounds. No wheezing.   Abdominal:      General: Bowel sounds are normal.      Palpations: Abdomen is soft.      Tenderness: There is no abdominal tenderness.   Musculoskeletal:         General: No tenderness.   Skin:     General: Skin is warm.      Findings: Rash present.      Comments: Diffuse urticarial rash involving all of his upper extremities, trunk, lower extremities.   Neurological:      Mental Status: He is alert and oriented to person, place, and time.   Psychiatric:         Mood and Affect: Mood normal.         Speech: Speech normal.         Behavior: Behavior is cooperative.           ED Course, Procedures, & Data      Procedures                      No results found for any visits on 01/26/24.  Medications   predniSONE (DELTASONE) tablet 60 mg (60 mg Oral $Given 1/26/24 0859)   sodium chloride 0.9% BOLUS 1,000 mL (0 mLs Intravenous Stopped 1/26/24 1010)   diphenhydrAMINE (BENADRYL) capsule 50 mg (50 mg Oral $Given 1/26/24 0858)   famotidine (PEPCID) tablet 40 mg (40 mg Oral $Given 1/26/24 0859)     Labs Ordered and Resulted from Time of ED Arrival to Time of ED Departure - No data to display  No orders to display          Critical care was not performed.     Medical Decision Making  The patient's presentation was of moderate complexity (an acute illness with systemic symptoms).    The patient's evaluation involved:  history and exam without other MDM  data elements    The patient's management necessitated moderate risk (prescription drug management including medications given in the ED).    Assessment & Plan    Mohit Fitzpatrick is a 33 year old male who presents emergency department with onset since last night of diffuse urticarial pruritic rash with concern for difficulty breathing as well.  History and exam are consistent with diffuse urticaria which is concerning for an allergic reaction.  Patient does not have anaphylaxis currently, reassuring respiratory exam.  Unclear etiology/underlying factor as to why he developed urticaria, however strongly concerning for a reaction to the Imodium.  Will add Imodium allergy to his chart and recommend avoiding it in the future.  In the meantime, we will go ahead and give a dose of Benadryl, famotidine, start prednisone here.  In my discussion with him, he feels dehydrated, has had significant GI losses from diarrhea in the last 24 hours.  Will go ahead and give IV fluids.  Patient slightly nauseated currently, would like to hold off on nausea medication at this time.  At this point, no indication for emergent labs or imaging.    I have reviewed the nursing notes. I have reviewed the findings, diagnosis, plan and need for follow up with the patient.      Patient received medications and was observed in the emergency department for a few hours.  He had significant improvement in his urticaria, reports feeling well.  Tolerated orals.  He would like to leave which I think is certainly reasonable.  He did not want a prescription for Benadryl or famotidine, I provided him recommendations on the discharge summary for these medications.  Additionally, I prescribed him a course of prednisone as well as an EpiPen.  I advised him that he may seek an appointment with an allergist as an outpatient to undergo further workup for what appears to have been an allergic reaction in the meantime.  Return precautions given.  Okay to  discharge    New Prescriptions    EPINEPHRINE (ANY BX GENERIC EQUIV) 0.3 MG/0.3ML INJECTION 2-PACK    Inject 0.3 mLs (0.3 mg) into the muscle as needed for anaphylaxis May repeat one time in 5-15 minutes if response to initial dose is inadequate.    PREDNISONE (DELTASONE) 50 MG TABLET    Take 1 tablet (50 mg) by mouth daily for 4 days       Final diagnoses:   Urticaria       Juan Turner MD PhD  [facility] EMERGENCY DEPARTMENT  1/26/2024     Juan Turner MD  01/26/24 1100

## 2024-01-26 NOTE — ED TRIAGE NOTES
States he has been sick with GI stuff beginning Sunday night and then onset of diarrhea Monday/Tuesday and reports last night broke out in hives and itching that got worse this morning and feels like it is hard to breathe. Reports only new item would be the imodium that he took yesterday.

## 2024-01-26 NOTE — ED TRIAGE NOTES
Triage Assessment (Adult)       Row Name 01/26/24 0817          Triage Assessment    Airway WDL WDL        Respiratory WDL    Respiratory WDL WDL        Skin Circulation/Temperature WDL    Skin Circulation/Temperature WDL WDL        Peripheral/Neurovascular WDL    Peripheral Neurovascular WDL WDL        Cognitive/Neuro/Behavioral WDL    Cognitive/Neuro/Behavioral WDL WDL

## 2024-01-30 NOTE — PROGRESS NOTES
"  Assessment & Plan   Problem List Items Addressed This Visit          Behavioral    Anxiety and depression    Relevant Medications    citalopram (CELEXA) 10 MG tablet     Other Visit Diagnoses       Weight gain    -  Primary    Relevant Medications    tirzepatide-Weight Management (ZEPBOUND) 2.5 MG/0.5ML prefilled pen           Lengthy discussion about weight loss and the complications involved with starting a medication like tirzepatide including cost, availability, insurance coverage, nausea, likelihood of significant rebound weight gain if medication is discontinued. I also offered alternatives such as a referral to [facility], but Regulo is set on giving medication a try. I also discussed with Regulo that by all measures he is currently at a very healthy weight and should not obsess about getting down to \"170 pounds\". Given Regulo's insistence, I have agreed t an initial Rx as noted above. Will continue to monitor progress going forward.     32 minutes spent on the date of the encounter doing chart review, history and exam, documentation and further activities as noted.    Niles Payne MD  11:30 AM, January 31, 2024        Subjective   Regulo is a 33 year old, presenting for the following health issues:  Recheck Medication (Citalopram) and Medication Request (Semaglutide)    HPI   Mental Health Follow Up  - current meds    - citalopram 10mg daily  - feels medication at current dose is working well  - no concerns for side effects      10/14/2020    11:00 AM 1/21/2022     9:48 AM 1/31/2024     9:15 AM   PHQ   PHQ-9 Total Score 3 3 2   Q9: Thoughts of better off dead/self-harm past 2 weeks Not at all Not at all Not at all         10/14/2020    11:00 AM 1/21/2022     9:48 AM 1/31/2024     9:16 AM   PRESTON-7 SCORE   Total Score   3 (minimal anxiety)   Total Score 2 2 3         \"Semaglutide\"  - has been making a concerted effort to try and lose weight: exercising and watching calories   - " frustrated with lack of progress  - has been doing research on the GLP-1   - interested in starting medication for weight loss      Review of Systems  Constitutional, HEENT, cardiovascular, pulmonary, gi and gu systems are negative, except as otherwise noted.      Objective    /81 (BP Location: Left arm, Patient Position: Sitting, Cuff Size: Adult Large)   Pulse 51   Temp 97.2  F (36.2  C) (Skin)   Wt 84.8 kg (187 lb)   SpO2 99%   BMI 26.83 kg/m    Body mass index is 26.83 kg/m .  Physical Exam   GENERAL: alert and no distress  NECK: no adenopathy, no asymmetry, masses, or scars  RESP: lungs clear to auscultation - no rales, rhonchi or wheezes  CV: regular rate and rhythm, normal S1 S2, no S3 or S4, no murmur, click or rub, no peripheral edema  ABDOMEN: soft, nontender, no hepatosplenomegaly, no masses and bowel sounds normal  MS: no gross musculoskeletal defects noted, no edema            Signed Electronically by: Niles Payne MD    Answers submitted by the patient for this visit:  Patient Health Questionnaire (Submitted on 1/31/2024)  If you checked off any problems, how difficult have these problems made it for you to do your work, take care of things at home, or get along with other people?: Not difficult at all  PHQ9 TOTAL SCORE: 2  PRESTON-7 (Submitted on 1/31/2024)  PRESTON 7 TOTAL SCORE: 3

## 2024-01-31 ENCOUNTER — OFFICE VISIT (OUTPATIENT)
Dept: FAMILY MEDICINE | Facility: CLINIC | Age: 34
End: 2024-01-31
Payer: COMMERCIAL

## 2024-01-31 ENCOUNTER — TELEPHONE (OUTPATIENT)
Dept: FAMILY MEDICINE | Facility: CLINIC | Age: 34
End: 2024-01-31

## 2024-01-31 VITALS
HEART RATE: 51 BPM | WEIGHT: 187 LBS | OXYGEN SATURATION: 99 % | TEMPERATURE: 97.2 F | BODY MASS INDEX: 26.83 KG/M2 | DIASTOLIC BLOOD PRESSURE: 81 MMHG | SYSTOLIC BLOOD PRESSURE: 123 MMHG

## 2024-01-31 DIAGNOSIS — R63.5 WEIGHT GAIN: Primary | ICD-10-CM

## 2024-01-31 DIAGNOSIS — F41.9 ANXIETY AND DEPRESSION: ICD-10-CM

## 2024-01-31 DIAGNOSIS — F32.A ANXIETY AND DEPRESSION: ICD-10-CM

## 2024-01-31 RX ORDER — CITALOPRAM HYDROBROMIDE 10 MG/1
10 TABLET ORAL DAILY
Qty: 90 TABLET | Refills: 3 | Status: SHIPPED | OUTPATIENT
Start: 2024-01-31

## 2024-01-31 ASSESSMENT — ANXIETY QUESTIONNAIRES
8. IF YOU CHECKED OFF ANY PROBLEMS, HOW DIFFICULT HAVE THESE MADE IT FOR YOU TO DO YOUR WORK, TAKE CARE OF THINGS AT HOME, OR GET ALONG WITH OTHER PEOPLE?: SOMEWHAT DIFFICULT
GAD7 TOTAL SCORE: 3
6. BECOMING EASILY ANNOYED OR IRRITABLE: SEVERAL DAYS
7. FEELING AFRAID AS IF SOMETHING AWFUL MIGHT HAPPEN: NOT AT ALL
2. NOT BEING ABLE TO STOP OR CONTROL WORRYING: NOT AT ALL
IF YOU CHECKED OFF ANY PROBLEMS ON THIS QUESTIONNAIRE, HOW DIFFICULT HAVE THESE PROBLEMS MADE IT FOR YOU TO DO YOUR WORK, TAKE CARE OF THINGS AT HOME, OR GET ALONG WITH OTHER PEOPLE: SOMEWHAT DIFFICULT
3. WORRYING TOO MUCH ABOUT DIFFERENT THINGS: NOT AT ALL
4. TROUBLE RELAXING: SEVERAL DAYS
7. FEELING AFRAID AS IF SOMETHING AWFUL MIGHT HAPPEN: NOT AT ALL
5. BEING SO RESTLESS THAT IT IS HARD TO SIT STILL: NOT AT ALL
1. FEELING NERVOUS, ANXIOUS, OR ON EDGE: SEVERAL DAYS

## 2024-01-31 ASSESSMENT — PATIENT HEALTH QUESTIONNAIRE - PHQ9
10. IF YOU CHECKED OFF ANY PROBLEMS, HOW DIFFICULT HAVE THESE PROBLEMS MADE IT FOR YOU TO DO YOUR WORK, TAKE CARE OF THINGS AT HOME, OR GET ALONG WITH OTHER PEOPLE: NOT DIFFICULT AT ALL
SUM OF ALL RESPONSES TO PHQ QUESTIONS 1-9: 2
SUM OF ALL RESPONSES TO PHQ QUESTIONS 1-9: 2

## 2024-01-31 NOTE — NURSING NOTE
"Regulo  33 year old    Chief Complaint   Patient presents with    Recheck Medication     Citalopram    Medication Request     Semaglutide            Blood pressure 123/81, pulse 51, temperature 97.2  F (36.2  C), temperature source Skin, weight 84.8 kg (187 lb), SpO2 99%. Body mass index is 26.83 kg/m .    Patient Active Problem List   Diagnosis    Anxiety and depression              Wt Readings from Last 2 Encounters:   01/31/24 84.8 kg (187 lb)   01/26/24 85.7 kg (189 lb)       BP Readings from Last 3 Encounters:   01/31/24 123/81   01/26/24 118/74   07/20/23 123/79                Current Outpatient Medications   Medication    citalopram (CELEXA) 10 MG tablet    EPINEPHrine (ANY BX GENERIC EQUIV) 0.3 MG/0.3ML injection 2-pack    Loperamide HCl (IMODIUM OR)    loratadine (CLARITIN) 10 MG tablet    penicillin V (VEETID) 500 MG tablet    predniSONE (DELTASONE) 50 MG tablet     No current facility-administered medications for this visit.              Social History     Tobacco Use    Smoking status: Former    Smokeless tobacco: Never    Tobacco comments:     occasionally   Vaping Use    Vaping Use: Never used   Substance Use Topics    Alcohol use: Yes    Drug use: Never              Health Maintenance Due   Topic Date Due    ADVANCE CARE PLANNING  Never done    HIV SCREENING  Never done    HEPATITIS C SCREENING  Never done    YEARLY PREVENTIVE VISIT  09/30/2021            No results found for: \"PAP\"           January 31, 2024 9:21 AM    "

## 2024-01-31 NOTE — TELEPHONE ENCOUNTER
PRIOR AUTHORIZATION DENIED    Medication: TIRZEPATIDE-WEIGHT MANAGEMENT 2.5 MG/0.5ML SC SOAJ  Insurance Company: [employer] ([facility]) - Phone [phone] Fax [phone]  Denial Date: 1/31/2024  Denial Reason(s):     Appeal Information:       Patient Notified: No

## 2024-02-01 ENCOUNTER — VIRTUAL VISIT (OUTPATIENT)
Dept: PHARMACY | Facility: CLINIC | Age: 34
End: 2024-02-01
Payer: COMMERCIAL

## 2024-02-01 DIAGNOSIS — F32.A ANXIETY AND DEPRESSION: Primary | ICD-10-CM

## 2024-02-01 DIAGNOSIS — F41.9 ANXIETY AND DEPRESSION: Primary | ICD-10-CM

## 2024-02-01 NOTE — PROGRESS NOTES
Telephone/video visits are billed at different rates depending on your insurance coverage. During this emergency period, for some insurers they may be billed the same as an in-person visit. Please reach out to your insurance provider with any questions. If during the course of the call the physician/provider feels a telephone visit is not appropriate, you will not be charged for this service.    SUBJECTIVE: Mohit Fitzpatrick is a 33 year old male who was referred by Niles Payne for [facility] services for weight management.    Weight management: Regulo notes that his weight is 187-190 pounds.  He has been focusing on working out more and eating better.  He would like to be at a weight of 170 pounds.  He notes that he is interested in a medication that would help him with his healthy habits and helping him to build more healthy appetites. He notes that he is aware of the side effects associated with GLP1-s.     Lifestyle  He started to make changes in October with his diet.     Regulo is working on getting more than 10,000 steps a day.  He runs 3 miles a few times a week.    Regulo stopped drinking alcohol one month ago.  He is also eating more fruits and vegetables. He does occasionally use an heidi to track how he is doing on counting calories.     Anxiety and depression: Regulo notes that he has been taking citalopram 10 mg for just over a year.  He notes that initially he took citalopram 20 mg, but the dose made him felt a bit off.  He does not have side effects.  He feels that this medication has worked well.     When we discussed the potential of bupropion, Regulo is open to this.      OBJECTIVE:    Patient Active Problem List   Diagnosis     Anxiety and depression        Current Outpatient Medications   Medication Sig Dispense Refill     citalopram (CELEXA) 10 MG tablet Take 1 tablet (10 mg) by mouth daily 90 tablet 3     EPINEPHrine (ANY BX GENERIC EQUIV) 0.3 MG/0.3ML injection 2-pack Inject 0.3 mLs (0.3 mg) into the muscle  as needed for anaphylaxis May repeat one time in 5-15 minutes if response to initial dose is inadequate. 2 each 0     Loperamide HCl (IMODIUM OR)        loratadine (CLARITIN) 10 MG tablet Take 10 mg by mouth daily as needed for allergies       tirzepatide-Weight Management (ZEPBOUND) 2.5 MG/0.5ML prefilled pen Inject 0.5 mLs (2.5 mg) Subcutaneous every 7 days 2 mL 0       ASSESSMENT:    Weight management: At goal per BMI.  Regulo's BMI is at 26.8 and he is making healthy changes in his lifestyle.  However, he is interested in a medication that could help him with some weight loss to reinforce and promote his healthy habits.  Zepbound was prescribed, but was denied by his insurance company.  One medication that could be considered that might get Regulo to his goal would be bupropion.  When we discussed this medication, we discussed first running this by Dr. Payne for him input.  If he agrees, perhaps the best plan would be to start bupropion and then after 2-3 months slowly taper off citalopram while monitoring efficacy for both mental health and weight.     All medications were reviewed and found to be indicated, effective, safe and convenient unless drug therapy problem identified as described above.     PLAN:      I will touch based with Regulo's PCP, Niles, about the potential of bupropion and then be in touch with Regulo.     Options for treatment and/or follow-up care were reviewed with the patient. Mohit Fitzpatrick was engaged and actively involved in the decision making process. He/She verbalized understanding of the options discussed and was satisfied with the final plan.     Patient was provided with written instructions/medication list via AVS.     BILLING:     Medical conditions reviewed: 2     Medications reviewed: 2     MTP identified: 1     Time spent: 30 minutes     Level of service: [place]

## 2024-02-01 NOTE — Clinical Note
Dakotah Cage - I had a visit with Regulo today and have a question for you. As you might have seen, Zepbound was not covered for Regulo.  Regulo and I discussed alternative options that might help him meet his goal that would help him to get a bit of weight loss to help promote some of his healthy habits.  We discussed bupropion and discussed potentially adding this medication to citalopram and then in a few months slowly decreasing citalopram.  He was on board with this idea and I told him that I wanted to first talk to you about it and he also wanted your perspectives and blessing on this idea.  Let me know what you think! - Hortencia

## 2024-02-05 ENCOUNTER — MYC MEDICAL ADVICE (OUTPATIENT)
Dept: PHARMACY | Facility: CLINIC | Age: 34
End: 2024-02-05

## 2024-02-05 DIAGNOSIS — F32.A ANXIETY AND DEPRESSION: Primary | ICD-10-CM

## 2024-02-05 DIAGNOSIS — F41.9 ANXIETY AND DEPRESSION: Primary | ICD-10-CM

## 2024-02-06 RX ORDER — BUPROPION HYDROCHLORIDE 150 MG/1
150 TABLET ORAL EVERY MORNING
Qty: 30 TABLET | Refills: 1 | Status: SHIPPED | OUTPATIENT
Start: 2024-02-06 | End: 2024-03-14 | Stop reason: DRUGHIGH

## 2024-02-25 ENCOUNTER — HEALTH MAINTENANCE LETTER (OUTPATIENT)
Age: 34
End: 2024-02-25

## 2024-03-12 ENCOUNTER — MYC MEDICAL ADVICE (OUTPATIENT)
Dept: PHARMACY | Facility: CLINIC | Age: 34
End: 2024-03-12

## 2024-03-12 DIAGNOSIS — F41.9 ANXIETY AND DEPRESSION: ICD-10-CM

## 2024-03-12 DIAGNOSIS — F32.A ANXIETY AND DEPRESSION: ICD-10-CM

## 2024-03-14 RX ORDER — BUPROPION HYDROCHLORIDE 300 MG/1
300 TABLET ORAL EVERY MORNING
Qty: 30 TABLET | Refills: 0 | Status: SHIPPED | OUTPATIENT
Start: 2024-03-14 | End: 2024-04-23

## 2024-04-23 DIAGNOSIS — F41.9 ANXIETY AND DEPRESSION: ICD-10-CM

## 2024-04-23 DIAGNOSIS — F32.A ANXIETY AND DEPRESSION: ICD-10-CM

## 2024-04-24 RX ORDER — BUPROPION HYDROCHLORIDE 300 MG/1
300 TABLET ORAL EVERY MORNING
Qty: 30 TABLET | Refills: 0 | Status: SHIPPED | OUTPATIENT
Start: 2024-04-24

## 2024-04-24 NOTE — TELEPHONE ENCOUNTER
Medication requested: buPROPion (WELLBUTRIN XL) 300 MG 24 hr tablet   Last office visit: 1/31/24  [facility] appointments: none  Medication last refilled: 3/14/24; 30 + 0 refills  Last qualifying labs:    1/31/2024  9:16 AM   PHQ-9 / PRESTON-7 Scores 8/2015 to present    PRESTON-7 Score DocFlow 3       1/31/2024  9:15 AM   PHQ-9 / PRESTON-7 Scores 8/2015 to present    PHQ-9 Score DocFlow 2      Medication is being filled for 1 time refill only due to:  Patient needs to be seen because due for follow-up with Hortencia Hill.    Amol JAMISON, RN  04/24/24 9:15 AM

## 2024-08-28 ENCOUNTER — TRANSFERRED RECORDS (OUTPATIENT)
Dept: HEALTH INFORMATION MANAGEMENT | Facility: CLINIC | Age: 34
End: 2024-08-28
Payer: COMMERCIAL

## 2024-11-06 NOTE — PROGRESS NOTES
Assessment & Plan   Problem List Items Addressed This Visit        Behavioral    Anxiety and depression    Relevant Medications    citalopram (CELEXA) 10 MG tablet      Other Visit Diagnoses     Need for prophylactic vaccination and inoculation against influenza    -  Primary    Relevant Orders    INFLUENZA VACCINE IM > 6 MONTHS VALENT IIV4 (FLUZONE) (Completed)    Rash        Relevant Medications    triamcinolone (KENALOG) 0.025 % cream         Los suspicion for infection given history. Considered some sort of auto-immune process like lupus or scleroderma, but feel this is less likely. More likely persistent dermatitis/inflamation. Will start a slightly stronger steroid and gave specific instructions to use twice daily for the next 6 days, then switch to a gentler emollient like aquaphor ointment. I have also recommended Regulo make an appointment with dermatology, as if this fails to improve symptoms I would want him to be seen by a dermatologist.     22 minutes spent on the date of the encounter doing chart review, history and exam, documentation and further activities as noted.    Niles Payne MD  [facility]    Subjective   Regulo is a 32 year old, presenting for the following health issues:  Derm Problem (Patient was seen about the redness and mild burning around his left eye x4 months. He did tried using the ointment but it didn't seem to help. ) and Flu Shot      HPI   Persistent redness around eye  - seen by Dr. Marie for this back in March  - per patient, initially tried an antibiotic which didn't really help  - eventually tried a low dose topica steroid which seemed to help some, but he admits he was not consistent with using this  - sometimes itchy, less frequently painful  - has tried to get in to see a dermatologist about this but they are all booked out for several months  - denies any vision changes, no posterior eye pressure, no headaches      Review of Systems   Constitutional, HEENT,  cardiovascular, pulmonary, gi and gu systems are negative, except as otherwise noted.      Objective    /75 (BP Location: Left arm, Patient Position: Chair, Cuff Size: Adult Large)   Pulse 58   Temp 98.1  F (36.7  C)   Resp 16   Wt 83.9 kg (185 lb 0.6 oz)   SpO2 96%   BMI 26.93 kg/m    Body mass index is 26.93 kg/m .  Physical Exam   GENERAL: healthy, alert and no distress  EYES: moderate erythema mostly notable at lower eyelid of RIGHT eye; vision grossly normal with no discharge  NECK: no adenopathy, no asymmetry, masses, or scars and thyroid normal to palpation  RESP: lungs clear to auscultation - no rales, rhonchi or wheezes  CV: regular rate and rhythm, normal S1 S2, no S3 or S4, no murmur, click or rub, no peripheral edema and peripheral pulses strong  ABDOMEN: soft, nontender, no hepatosplenomegaly, no masses and bowel sounds normal  MS: no gross musculoskeletal defects noted, no edema               Patient is a 89y old  Female who presents with a chief complaint of Fever/Cough (06 Nov 2024 06:51)      Patient seen and examined at bedside.  Pt with complaint of alot of mucus and chest congestion.  Unsure if HHA is set up     ALLERGIES:  tramadol (Nausea; Drowsiness)  Omnipaque 180 Redi-Unit (Hives)  Percocet 10/325 (Vomiting)  red dye (Flushing; Rash)    MEDICATIONS  (STANDING):  albuterol   0.042% 1.25 milliGRAM(s) Nebulizer every 6 hours  azithromycin   Tablet 250 milliGRAM(s) Oral daily  cefTRIAXone   IVPB 1000 milliGRAM(s) IV Intermittent every 24 hours  enoxaparin Injectable 40 milliGRAM(s) SubCutaneous every 24 hours  guaiFENesin  milliGRAM(s) Oral two times a day  lactobacillus acidophilus 1 Tablet(s) Oral two times a day with meals  polyethylene glycol 3350 17 Gram(s) Oral daily  tiotropium 2.5 MICROgram(s)/olodaterol 2.5 MICROgram(s) (STIOLTO) Inhaler 2 Puff(s) Inhalation daily    MEDICATIONS  (PRN):  acetaminophen     Tablet .. 650 milliGRAM(s) Oral every 6 hours PRN Temp greater or equal to 38C (100.4F), Mild Pain (1 - 3), Moderate Pain (4 - 6), Severe Pain (7 - 10)  aluminum hydroxide/magnesium hydroxide/simethicone Suspension 30 milliLiter(s) Oral every 4 hours PRN Dyspepsia  melatonin 3 milliGRAM(s) Oral at bedtime PRN Insomnia  ondansetron Injectable 4 milliGRAM(s) IV Push every 8 hours PRN Nausea and/or Vomiting    Vital Signs Last 24 Hrs  T(F): 97.6 (06 Nov 2024 04:46), Max: 98.2 (05 Nov 2024 13:28)  HR: 60 (06 Nov 2024 04:46) (60 - 77)  BP: 131/80 (06 Nov 2024 04:46) (121/74 - 131/80)  RR: 18 (06 Nov 2024 04:46) (18 - 18)  SpO2: 96% (06 Nov 2024 04:46) (94% - 97%)  I&O's Summary    05 Nov 2024 07:01  -  06 Nov 2024 07:00  --------------------------------------------------------  IN: 900 mL / OUT: 0 mL / NET: 900 mL      PHYSICAL EXAM:  General:89 y/o female in  NAD, A/O x 3 currently sitting up in chair on RA at 96%  ENT: MMM  Neck: Supple, No JVD  Lungs: Course BS to auscultation bilaterally, Non labored breathing   Cardio: RRR, S1/S2, No murmurs  Abdomen: Soft, Nontender, Nondistended; Bowel sounds present  Extremities: No calf tenderness, No pitting edema    LABS:                        11.1   9.47  )-----------( 205      ( 05 Nov 2024 06:17 )             33.6     11-05    144  |  109  |  22  ----------------------------<  81  4.0   |  29  |  0.99    Ca    9.0      05 Nov 2024 06:17    TPro  6.8  /  Alb  3.1  /  TBili  1.6  /  DBili  x   /  AST  26  /  ALT  22  /  AlkPhos  93  11-03      PT/INR - ( 03 Nov 2024 11:40 )   PT: 10.9 sec;   INR: 0.92 ratio         PTT - ( 03 Nov 2024 11:40 )  PTT:28.8 sec  Lactate, Blood: 0.5 mmol/L (11-03 @ 11:40)    CARDIAC MARKERS ( 03 Nov 2024 11:40 )  x     / 7.2 ng/L / x     / x     / x                            Urinalysis Basic - ( 05 Nov 2024 06:17 )    Color: x / Appearance: x / SG: x / pH: x  Gluc: 81 mg/dL / Ketone: x  / Bili: x / Urobili: x   Blood: x / Protein: x / Nitrite: x   Leuk Esterase: x / RBC: x / WBC x   Sq Epi: x / Non Sq Epi: x / Bacteria: x        Culture - Blood (collected 03 Nov 2024 11:45)  Source: .Blood BLOOD  Preliminary Report (05 Nov 2024 19:01):    No growth at 48 Hours    Culture - Blood (collected 03 Nov 2024 11:40)  Source: .Blood BLOOD  Preliminary Report (05 Nov 2024 19:01):    No growth at 48 Hours        RADIOLOGY & ADDITIONAL TESTS:    Care Discussed with Consultants/Other Providers:    Patient is a 89y old  Female who presents with a chief complaint of Fever/Cough (06 Nov 2024 06:51)      Patient seen and examined at bedside.  Pt with complaint of alot of mucus and chest congestion.  Unsure if HHA is set up, Daughter coming in this am     ALLERGIES:  tramadol (Nausea; Drowsiness)  Omnipaque 180 Redi-Unit (Hives)  Percocet 10/325 (Vomiting)  red dye (Flushing; Rash)    MEDICATIONS  (STANDING):  albuterol   0.042% 1.25 milliGRAM(s) Nebulizer every 6 hours  azithromycin   Tablet 250 milliGRAM(s) Oral daily  cefTRIAXone   IVPB 1000 milliGRAM(s) IV Intermittent every 24 hours  enoxaparin Injectable 40 milliGRAM(s) SubCutaneous every 24 hours  guaiFENesin  milliGRAM(s) Oral two times a day  lactobacillus acidophilus 1 Tablet(s) Oral two times a day with meals  polyethylene glycol 3350 17 Gram(s) Oral daily  tiotropium 2.5 MICROgram(s)/olodaterol 2.5 MICROgram(s) (STIOLTO) Inhaler 2 Puff(s) Inhalation daily    MEDICATIONS  (PRN):  acetaminophen     Tablet .. 650 milliGRAM(s) Oral every 6 hours PRN Temp greater or equal to 38C (100.4F), Mild Pain (1 - 3), Moderate Pain (4 - 6), Severe Pain (7 - 10)  aluminum hydroxide/magnesium hydroxide/simethicone Suspension 30 milliLiter(s) Oral every 4 hours PRN Dyspepsia  melatonin 3 milliGRAM(s) Oral at bedtime PRN Insomnia  ondansetron Injectable 4 milliGRAM(s) IV Push every 8 hours PRN Nausea and/or Vomiting    Vital Signs Last 24 Hrs  T(F): 97.6 (06 Nov 2024 04:46), Max: 98.2 (05 Nov 2024 13:28)  HR: 60 (06 Nov 2024 04:46) (60 - 77)  BP: 131/80 (06 Nov 2024 04:46) (121/74 - 131/80)  RR: 18 (06 Nov 2024 04:46) (18 - 18)  SpO2: 96% (06 Nov 2024 04:46) (94% - 97%)  I&O's Summary    05 Nov 2024 07:01  -  06 Nov 2024 07:00  --------------------------------------------------------  IN: 900 mL / OUT: 0 mL / NET: 900 mL      PHYSICAL EXAM:  General:89 y/o female in  NAD, A/O x 3 currently sitting up in chair on RA at 96%  ENT: MMM  Neck: Supple, No JVD  Lungs: Course BS to auscultation bilaterally, Non labored breathing   Cardio: RRR, S1/S2, No murmurs  Abdomen: Soft, Nontender, Nondistended; Bowel sounds present  Extremities: No calf tenderness, No pitting edema    LABS:                        11.1   9.47  )-----------( 205      ( 05 Nov 2024 06:17 )             33.6     11-05    144  |  109  |  22  ----------------------------<  81  4.0   |  29  |  0.99    Ca    9.0      05 Nov 2024 06:17    TPro  6.8  /  Alb  3.1  /  TBili  1.6  /  DBili  x   /  AST  26  /  ALT  22  /  AlkPhos  93  11-03      PT/INR - ( 03 Nov 2024 11:40 )   PT: 10.9 sec;   INR: 0.92 ratio         PTT - ( 03 Nov 2024 11:40 )  PTT:28.8 sec  Lactate, Blood: 0.5 mmol/L (11-03 @ 11:40)    CARDIAC MARKERS ( 03 Nov 2024 11:40 )  x     / 7.2 ng/L / x     / x     / x                            Urinalysis Basic - ( 05 Nov 2024 06:17 )    Color: x / Appearance: x / SG: x / pH: x  Gluc: 81 mg/dL / Ketone: x  / Bili: x / Urobili: x   Blood: x / Protein: x / Nitrite: x   Leuk Esterase: x / RBC: x / WBC x   Sq Epi: x / Non Sq Epi: x / Bacteria: x        Culture - Blood (collected 03 Nov 2024 11:45)  Source: .Blood BLOOD  Preliminary Report (05 Nov 2024 19:01):    No growth at 48 Hours    Culture - Blood (collected 03 Nov 2024 11:40)  Source: .Blood BLOOD  Preliminary Report (05 Nov 2024 19:01):    No growth at 48 Hours        RADIOLOGY & ADDITIONAL TESTS:    Care Discussed with Consultants/Other Providers:

## 2024-11-11 ENCOUNTER — ANCILLARY PROCEDURE (OUTPATIENT)
Dept: GENERAL RADIOLOGY | Facility: CLINIC | Age: 34
End: 2024-11-11
Attending: PHYSICIAN ASSISTANT
Payer: COMMERCIAL

## 2024-11-11 ENCOUNTER — OFFICE VISIT (OUTPATIENT)
Dept: FAMILY MEDICINE | Facility: CLINIC | Age: 34
End: 2024-11-11
Payer: COMMERCIAL

## 2024-11-11 ENCOUNTER — OFFICE VISIT (OUTPATIENT)
Dept: ORTHOPEDICS | Facility: CLINIC | Age: 34
End: 2024-11-11
Payer: COMMERCIAL

## 2024-11-11 VITALS
BODY MASS INDEX: 26.22 KG/M2 | TEMPERATURE: 97.5 F | WEIGHT: 177 LBS | DIASTOLIC BLOOD PRESSURE: 80 MMHG | HEART RATE: 81 BPM | HEIGHT: 69 IN | OXYGEN SATURATION: 98 % | SYSTOLIC BLOOD PRESSURE: 132 MMHG | RESPIRATION RATE: 18 BRPM

## 2024-11-11 DIAGNOSIS — M25.511 ACUTE PAIN OF RIGHT SHOULDER: Primary | ICD-10-CM

## 2024-11-11 DIAGNOSIS — M25.511 ACUTE PAIN OF RIGHT SHOULDER: ICD-10-CM

## 2024-11-11 PROCEDURE — 73030 X-RAY EXAM OF SHOULDER: CPT | Mod: TC | Performed by: RADIOLOGY

## 2024-11-11 PROCEDURE — 99203 OFFICE O/P NEW LOW 30 MIN: CPT | Performed by: PHYSICIAN ASSISTANT

## 2024-11-11 PROCEDURE — 99213 OFFICE O/P EST LOW 20 MIN: CPT | Performed by: FAMILY MEDICINE

## 2024-11-11 RX ORDER — DICLOFENAC SODIUM 75 MG/1
75 TABLET, DELAYED RELEASE ORAL 2 TIMES DAILY
Qty: 30 TABLET | Refills: 0 | Status: SHIPPED | OUTPATIENT
Start: 2024-11-11

## 2024-11-11 ASSESSMENT — PAIN SCALES - GENERAL: PAINLEVEL_OUTOF10: MILD PAIN (3)

## 2024-11-11 NOTE — PROGRESS NOTES
ASSESSMENT & PLAN  Mohit Fitzpatrick is seen today for his right shoulder pain.  I discussed with him that his x-rays are reassuring today.  His symptoms seem more consistent with his proximal biceps tendon.  We discussed that at this time to continue with the medication he was prescribed and to start physical therapy.  We discussed the recovery process.  We discussed that if his symptoms do not improve over the next 4 to 6 weeks to follow-up with sports medicine for repeat evaluation with consideration of corticosteroid injection versus advanced imaging.  He was understanding of this plan and agreement.  All questions answered.    Ayesha Lux PA-C  [facility]    -----  Chief Complaint   Patient presents with    Right Shoulder - Pain       SUBJECTIVE  Mohit Fitzpatrick is a/an 34 year old right-handed male who is seen as a WALK IN patient for evaluation of right shoulder pain.  Onset was Saturday morning and woke up with limited motion and pain. Pain is located shoulder region/anterior. Symptoms are worsened by movement of forward flexion. He has tried ibuprofen and ice.     The patient is seen alone    Prior injury/Surgical history of affected joint: denies  Social History/Occupation: marketing     REVIEW OF SYSTEMS:  Pertinent positives/negative: As stated above in HPI    OBJECTIVE:  There were no vitals taken for this visit.   General: Alert and in no distress  Skin: no visable rashes  CV: Extremities appear well perfused   Resp: normal respiratory effort, no conversational dyspnea   Psych: normal mood, affect  MSK:     Shoulder exam:  Right    Range of Motion:   Forward flexion: 140   Abduction:  90  Internal rotation: T12  External rotation: 90    Inspection:    There is no visible deformity  There is no erythema or signs of infection  There is no open wounds    Palpation:     Sternoclavicular joint nontender   Acromioclavicular joint nontender   Subacromial space nontender    Posterior shoulder and periscapular region nontender   Anterior shoulder/proximal biceps region tender    Strength:   Abduction (arm at side) 5/5  Internal rotation (arm at side) 5/5  External rotation (arm at side)  5/5  Adduction 5/5    Impingement tests:   Neer (forward flexion) painfree  Montoya (IR with arm flexed, abducted) painfree  Empty can (thumb down in scaption position) pain +  Crossover (AC joint compression) painfree  [place] (AC joint/labral compression) painfree  Speeds Negative    Sensation:  Intact to light touch throughout upper extremity     Radial pulse is palpable and equal to contralateral side        RADIOLOGY:  Final results and radiologist's interpretation available in the [facility] health record.  Images below were personally reviewed and discussed with the patient in the office today.  My personal interpretation of the performed imaging: Right shoulder x-rays demonstrated no acute fracture or dislocation.

## 2024-11-11 NOTE — PROGRESS NOTES
"  Assessment & Plan     Acute pain of right shoulder  I suspect he may have ruptured one of the proximal bicep tendon heads or have sent rotator cuff issues contributing to the pain and limited range of motion.  I did not have imaging available in the office.  I have suggested follow-up in the near future with orthopedics and prescribed Voltaren instead of ibuprofen or naproxen for pain control.  - Orthopedic  Referral; Future  - diclofenac (VOLTAREN) 75 MG EC tablet; Take 1 tablet (75 mg) by mouth 2 times daily.          BMI  Estimated body mass index is 25.95 kg/m  as calculated from the following:    Height as of this encounter: 1.759 m (5' 9.25\").    Weight as of this encounter: 80.3 kg (177 lb).         FUTURE APPOINTMENTS:       - Make appointment with orthopedic specialist    Quinton Rajput is a 34 year old, presenting for the following health issues:  Shoulder Pain (Woke up on Sat (11/9/24) with shoulder pain that has been getting worse.)      11/11/2024     1:20 PM   Additional Questions   Roomed by Suad Sinclair     Shoulder Pain    History of Present Illness       Reason for visit:  Shoulder injury  Symptom onset:  1-3 days ago  Symptoms include:  I cant use my right arm  Symptom intensity:  Severe  Symptom progression:  Worsening  Had these symptoms before:  No  What makes it worse:  Moving it  What makes it better:  No   He is taking medications regularly.           Patient woke up with shoulder pain 2 days ago.  It is progressively getting worse.  He localizes pain to the anterior shoulder.  He has not noticed any bruising.  He does not recall any injury.  He has limited range of motion and movement is painful.      Review of Systems  Constitutional, HEENT, cardiovascular, pulmonary, gi and gu systems are negative, except as otherwise noted.      Objective    /80   Pulse 81   Temp 97.5  F (36.4  C) (Temporal)   Resp 18   Ht 1.759 m (5' 9.25\")   Wt 80.3 kg (177 lb)   SpO2 98%   " BMI 25.95 kg/m    Body mass index is 25.95 kg/m .  Physical Exam   GENERAL: alert and no distress  EYES: Eyes grossly normal to inspection, PERRL and conjunctivae and sclerae normal  MS: Appearance of the right shoulder is normal.  There is no tenderness to palpation along the clavicle, AC joint, there is some anterior tenderness over the bicipital groove.  Speeds is positive.  Range of motion is limited only to about 30 degrees of forward elevation 90 degrees of abduction.  External rotation 45 degrees and internal rotation with adduction to T6  SKIN: no suspicious lesions or rashes  NEURO: Normal strength and tone, mentation intact and speech normal  PSYCH: mentation appears normal, affect normal/bright            Signed Electronically by: Jarrell Boland MD

## 2024-11-11 NOTE — PATIENT INSTRUCTIONS
1. Acute pain of right shoulder        - Seen today for right shoulder pain. Imaging was reassuring. Exam demonstrated more proximal biceps tendonitis  -Recommend to use the Voltaren medication prescribed, use heat and ice to the shoulder.  -Recommend physical therapy     Should symptoms to fail to improve with medication and therapy, follow up with sports medicine for possible injection vs advanced imaging    -Call direct clinic number [[phone]] at any time with questions or concerns.    -Orthopedic Walk in Monday through Thursday 8 am to 6 pm, Friday 8 am to 5 pm, Saturday 8 am to 12 pm at [address] [facility] [address] [place].

## 2024-11-11 NOTE — LETTER
11/11/2024      Mohit Fitzpatrick  [address]  [place] [place] [address]      Dear Colleague,    Thank you for referring your patient, Mohit Fitzpatrick, to the [facility] [place]. Please see a copy of my visit note below.    ASSESSMENT & PLAN  Mohit Fitzpatrick is seen today for his right shoulder pain.  I discussed with him that his x-rays are reassuring today.  His symptoms seem more consistent with his proximal biceps tendon.  We discussed that at this time to continue with the medication he was prescribed and to start physical therapy.  We discussed the recovery process.  We discussed that if his symptoms do not improve over the next 4 to 6 weeks to follow-up with sports medicine for repeat evaluation with consideration of corticosteroid injection versus advanced imaging.  He was understanding of this plan and agreement.  All questions answered.    Ayesha Lux PA-C  [facility]    -----  Chief Complaint   Patient presents with     Right Shoulder - Pain       SUBJECTIVE  Mohit Fitzpatrick is a/an 34 year old right-handed male who is seen as a WALK IN patient for evaluation of right shoulder pain.  Onset was Saturday morning and woke up with limited motion and pain. Pain is located shoulder region/anterior. Symptoms are worsened by movement of forward flexion. He has tried ibuprofen and ice.     The patient is seen alone    Prior injury/Surgical history of affected joint: denies  Social History/Occupation: marketing     REVIEW OF SYSTEMS:  Pertinent positives/negative: As stated above in HPI    OBJECTIVE:  There were no vitals taken for this visit.   General: Alert and in no distress  Skin: no visable rashes  CV: Extremities appear well perfused   Resp: normal respiratory effort, no conversational dyspnea   Psych: normal mood, affect  MSK:     Shoulder exam:  Right    Range of Motion:   Forward flexion: 140   Abduction:  90  Internal rotation:  T12  External rotation: 90    Inspection:    There is no visible deformity  There is no erythema or signs of infection  There is no open wounds    Palpation:     Sternoclavicular joint nontender   Acromioclavicular joint nontender   Subacromial space nontender   Posterior shoulder and periscapular region nontender   Anterior shoulder/proximal biceps region tender    Strength:   Abduction (arm at side) 5/5  Internal rotation (arm at side) 5/5  External rotation (arm at side)  5/5  Adduction 5/5    Impingement tests:   Neer (forward flexion) painfree  Montoya (IR with arm flexed, abducted) painfree  Empty can (thumb down in scaption position) pain +  Crossover (AC joint compression) painfree  [place] (AC joint/labral compression) painfree  Speeds Negative    Sensation:  Intact to light touch throughout upper extremity     Radial pulse is palpable and equal to contralateral side        RADIOLOGY:  Final results and radiologist's interpretation available in the [facility] health record.  Images below were personally reviewed and discussed with the patient in the office today.  My personal interpretation of the performed imaging: Right shoulder x-rays demonstrated no acute fracture or dislocation.        Again, thank you for allowing me to participate in the care of your patient.        Sincerely,        Ayesha Lux PA-C

## 2024-11-12 ENCOUNTER — PATIENT OUTREACH (OUTPATIENT)
Dept: CARE COORDINATION | Facility: CLINIC | Age: 34
End: 2024-11-12
Payer: COMMERCIAL

## 2024-11-14 ENCOUNTER — PATIENT OUTREACH (OUTPATIENT)
Dept: CARE COORDINATION | Facility: CLINIC | Age: 34
End: 2024-11-14
Payer: COMMERCIAL

## 2025-01-21 NOTE — PATIENT INSTRUCTIONS
ASSESSMENT/PLAN:    1. Blepharitis   - Wash lashes and lids before bed and in morning daily.   - Apply Ilotycin (Erythromycin) antibiotic ointment 3-4 times/day  - Minimize use of other creams  - Keep hands away from eyelids if possible.   - If no improvement, may try low potency cortisone cream or call us and we can prescribe Desonide ointment.     --Mina Marie MD   See previous note

## 2025-02-09 ENCOUNTER — TRANSFERRED RECORDS (OUTPATIENT)
Dept: HEALTH INFORMATION MANAGEMENT | Facility: CLINIC | Age: 35
End: 2025-02-09
Payer: COMMERCIAL

## 2025-03-15 ENCOUNTER — HEALTH MAINTENANCE LETTER (OUTPATIENT)
Age: 35
End: 2025-03-15

## 2025-06-02 DIAGNOSIS — F32.A ANXIETY AND DEPRESSION: ICD-10-CM

## 2025-06-02 DIAGNOSIS — F41.9 ANXIETY AND DEPRESSION: ICD-10-CM

## 2025-06-03 RX ORDER — CITALOPRAM HYDROBROMIDE 10 MG/1
10 TABLET ORAL DAILY
Qty: 30 TABLET | Refills: 0 | Status: SHIPPED | OUTPATIENT
Start: 2025-06-03

## 2025-06-20 ENCOUNTER — TRANSFERRED RECORDS (OUTPATIENT)
Dept: HEALTH INFORMATION MANAGEMENT | Facility: CLINIC | Age: 35
End: 2025-06-20

## 2025-06-24 ENCOUNTER — OFFICE VISIT (OUTPATIENT)
Dept: FAMILY MEDICINE | Facility: CLINIC | Age: 35
End: 2025-06-24
Payer: COMMERCIAL

## 2025-06-24 ENCOUNTER — RESULTS FOLLOW-UP (OUTPATIENT)
Dept: FAMILY MEDICINE | Facility: CLINIC | Age: 35
End: 2025-06-24

## 2025-06-24 VITALS
OXYGEN SATURATION: 97 % | SYSTOLIC BLOOD PRESSURE: 124 MMHG | BODY MASS INDEX: 25.92 KG/M2 | HEART RATE: 63 BPM | HEIGHT: 69 IN | TEMPERATURE: 98 F | DIASTOLIC BLOOD PRESSURE: 77 MMHG | WEIGHT: 175 LBS

## 2025-06-24 DIAGNOSIS — Z00.00 WELLNESS EXAMINATION: Primary | ICD-10-CM

## 2025-06-24 DIAGNOSIS — F10.90 ALCOHOL USE: ICD-10-CM

## 2025-06-24 DIAGNOSIS — F32.A ANXIETY AND DEPRESSION: ICD-10-CM

## 2025-06-24 DIAGNOSIS — F41.9 ANXIETY AND DEPRESSION: ICD-10-CM

## 2025-06-24 DIAGNOSIS — Z13.228 SCREENING FOR METABOLIC DISORDER: ICD-10-CM

## 2025-06-24 DIAGNOSIS — Z71.3 WEIGHT LOSS COUNSELING, ENCOUNTER FOR: ICD-10-CM

## 2025-06-24 LAB
ALBUMIN SERPL BCG-MCNC: 4.7 G/DL (ref 3.5–5.2)
ALP SERPL-CCNC: 99 U/L (ref 40–150)
ALT SERPL W P-5'-P-CCNC: 73 U/L (ref 0–70)
ANION GAP SERPL CALCULATED.3IONS-SCNC: 9 MMOL/L (ref 7–15)
AST SERPL W P-5'-P-CCNC: 42 U/L (ref 0–45)
BILIRUB SERPL-MCNC: 0.8 MG/DL
BUN SERPL-MCNC: 10.4 MG/DL (ref 6–20)
CALCIUM SERPL-MCNC: 10.1 MG/DL (ref 8.8–10.4)
CHLORIDE SERPL-SCNC: 102 MMOL/L (ref 98–107)
CREAT SERPL-MCNC: 0.72 MG/DL (ref 0.67–1.17)
EGFRCR SERPLBLD CKD-EPI 2021: >90 ML/MIN/1.73M2
GLUCOSE SERPL-MCNC: 86 MG/DL (ref 70–99)
HCO3 SERPL-SCNC: 28 MMOL/L (ref 22–29)
POTASSIUM SERPL-SCNC: 4.5 MMOL/L (ref 3.4–5.3)
PROT SERPL-MCNC: 7.7 G/DL (ref 6.4–8.3)
SODIUM SERPL-SCNC: 139 MMOL/L (ref 135–145)

## 2025-06-24 RX ORDER — CITALOPRAM HYDROBROMIDE 20 MG/1
20 TABLET ORAL DAILY
Qty: 30 TABLET | Refills: 0 | Status: SHIPPED | OUTPATIENT
Start: 2025-06-24

## 2025-06-24 SDOH — HEALTH STABILITY: PHYSICAL HEALTH: ON AVERAGE, HOW MANY DAYS PER WEEK DO YOU ENGAGE IN MODERATE TO STRENUOUS EXERCISE (LIKE A BRISK WALK)?: 4 DAYS

## 2025-06-24 ASSESSMENT — ANXIETY QUESTIONNAIRES
1. FEELING NERVOUS, ANXIOUS, OR ON EDGE: SEVERAL DAYS
2. NOT BEING ABLE TO STOP OR CONTROL WORRYING: NOT AT ALL
5. BEING SO RESTLESS THAT IT IS HARD TO SIT STILL: NOT AT ALL
7. FEELING AFRAID AS IF SOMETHING AWFUL MIGHT HAPPEN: NOT AT ALL
7. FEELING AFRAID AS IF SOMETHING AWFUL MIGHT HAPPEN: NOT AT ALL
IF YOU CHECKED OFF ANY PROBLEMS ON THIS QUESTIONNAIRE, HOW DIFFICULT HAVE THESE PROBLEMS MADE IT FOR YOU TO DO YOUR WORK, TAKE CARE OF THINGS AT HOME, OR GET ALONG WITH OTHER PEOPLE: NOT DIFFICULT AT ALL
4. TROUBLE RELAXING: SEVERAL DAYS
GAD7 TOTAL SCORE: 3
6. BECOMING EASILY ANNOYED OR IRRITABLE: SEVERAL DAYS
3. WORRYING TOO MUCH ABOUT DIFFERENT THINGS: NOT AT ALL
GAD7 TOTAL SCORE: 3
GAD7 TOTAL SCORE: 3
8. IF YOU CHECKED OFF ANY PROBLEMS, HOW DIFFICULT HAVE THESE MADE IT FOR YOU TO DO YOUR WORK, TAKE CARE OF THINGS AT HOME, OR GET ALONG WITH OTHER PEOPLE?: NOT DIFFICULT AT ALL

## 2025-06-24 ASSESSMENT — SOCIAL DETERMINANTS OF HEALTH (SDOH): HOW OFTEN DO YOU GET TOGETHER WITH FRIENDS OR RELATIVES?: THREE TIMES A WEEK

## 2025-06-24 ASSESSMENT — PATIENT HEALTH QUESTIONNAIRE - PHQ9
10. IF YOU CHECKED OFF ANY PROBLEMS, HOW DIFFICULT HAVE THESE PROBLEMS MADE IT FOR YOU TO DO YOUR WORK, TAKE CARE OF THINGS AT HOME, OR GET ALONG WITH OTHER PEOPLE: NOT DIFFICULT AT ALL
SUM OF ALL RESPONSES TO PHQ QUESTIONS 1-9: 0
SUM OF ALL RESPONSES TO PHQ QUESTIONS 1-9: 0
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
## 2025-06-25 ENCOUNTER — PATIENT OUTREACH (OUTPATIENT)
Dept: CARE COORDINATION | Facility: CLINIC | Age: 35
End: 2025-06-25

## 2025-08-11 DIAGNOSIS — F41.9 ANXIETY AND DEPRESSION: ICD-10-CM

## 2025-08-11 DIAGNOSIS — F32.A ANXIETY AND DEPRESSION: ICD-10-CM

## 2025-08-13 RX ORDER — CITALOPRAM HYDROBROMIDE 20 MG/1
20 TABLET ORAL DAILY
Qty: 90 TABLET | Refills: 3 | Status: SHIPPED | OUTPATIENT
Start: 2025-08-13